# Patient Record
Sex: MALE | Race: WHITE | NOT HISPANIC OR LATINO
[De-identification: names, ages, dates, MRNs, and addresses within clinical notes are randomized per-mention and may not be internally consistent; named-entity substitution may affect disease eponyms.]

---

## 2017-01-07 ENCOUNTER — RX RENEWAL (OUTPATIENT)
Age: 77
End: 2017-01-07

## 2017-01-09 ENCOUNTER — APPOINTMENT (OUTPATIENT)
Dept: CARDIOLOGY | Facility: CLINIC | Age: 77
End: 2017-01-09

## 2017-01-09 ENCOUNTER — NON-APPOINTMENT (OUTPATIENT)
Age: 77
End: 2017-01-09

## 2017-01-09 VITALS
TEMPERATURE: 98.4 F | DIASTOLIC BLOOD PRESSURE: 75 MMHG | BODY MASS INDEX: 36.73 KG/M2 | WEIGHT: 234 LBS | HEIGHT: 67 IN | HEART RATE: 68 BPM | OXYGEN SATURATION: 97 % | SYSTOLIC BLOOD PRESSURE: 187 MMHG

## 2017-01-09 VITALS — DIASTOLIC BLOOD PRESSURE: 80 MMHG | SYSTOLIC BLOOD PRESSURE: 158 MMHG

## 2017-01-09 DIAGNOSIS — R00.2 PALPITATIONS: ICD-10-CM

## 2017-01-09 DIAGNOSIS — R10.31 RIGHT LOWER QUADRANT PAIN: ICD-10-CM

## 2017-01-10 ENCOUNTER — APPOINTMENT (OUTPATIENT)
Dept: INTERNAL MEDICINE | Facility: CLINIC | Age: 77
End: 2017-01-10

## 2017-01-10 VITALS
WEIGHT: 233 LBS | OXYGEN SATURATION: 97 % | BODY MASS INDEX: 36.57 KG/M2 | HEIGHT: 67 IN | TEMPERATURE: 97.8 F | HEART RATE: 58 BPM

## 2017-01-10 VITALS — DIASTOLIC BLOOD PRESSURE: 78 MMHG | SYSTOLIC BLOOD PRESSURE: 130 MMHG

## 2017-01-12 LAB
25(OH)D3 SERPL-MCNC: 47.3 NG/ML
ALBUMIN SERPL ELPH-MCNC: 4.7 G/DL
ALP BLD-CCNC: 45 U/L
ALT SERPL-CCNC: 41 U/L
ANION GAP SERPL CALC-SCNC: 16 MMOL/L
AST SERPL-CCNC: 43 U/L
BASOPHILS # BLD AUTO: 0.05 K/UL
BASOPHILS NFR BLD AUTO: 0.8 %
BILIRUB SERPL-MCNC: 0.9 MG/DL
BUN SERPL-MCNC: 19 MG/DL
CALCIUM SERPL-MCNC: 9.9 MG/DL
CHLORIDE SERPL-SCNC: 102 MMOL/L
CHOLEST SERPL-MCNC: 223 MG/DL
CHOLEST/HDLC SERPL: 3.7 RATIO
CO2 SERPL-SCNC: 26 MMOL/L
CREAT SERPL-MCNC: 1.07 MG/DL
EOSINOPHIL # BLD AUTO: 0.33 K/UL
EOSINOPHIL NFR BLD AUTO: 5 %
GLUCOSE SERPL-MCNC: 130 MG/DL
HBA1C MFR BLD HPLC: 6.2 %
HCT VFR BLD CALC: 48.9 %
HDLC SERPL-MCNC: 60 MG/DL
HGB BLD-MCNC: 15.6 G/DL
IMM GRANULOCYTES NFR BLD AUTO: 0.3 %
LDLC SERPL CALC-MCNC: 136 MG/DL
LYMPHOCYTES # BLD AUTO: 2.1 K/UL
LYMPHOCYTES NFR BLD AUTO: 32.1 %
MAN DIFF?: NORMAL
MCHC RBC-ENTMCNC: 31.9 GM/DL
MCHC RBC-ENTMCNC: 32.6 PG
MCV RBC AUTO: 102.3 FL
MONOCYTES # BLD AUTO: 0.76 K/UL
MONOCYTES NFR BLD AUTO: 11.6 %
NEUTROPHILS # BLD AUTO: 3.28 K/UL
NEUTROPHILS NFR BLD AUTO: 50.2 %
PLATELET # BLD AUTO: 184 K/UL
POTASSIUM SERPL-SCNC: 4.4 MMOL/L
PROT SERPL-MCNC: 7.7 G/DL
RBC # BLD: 4.78 M/UL
RBC # FLD: 14.3 %
SODIUM SERPL-SCNC: 144 MMOL/L
T4 SERPL-MCNC: 6.6 UG/DL
TRIGL SERPL-MCNC: 137 MG/DL
TSH SERPL-ACNC: 2.62 UIU/ML
WBC # FLD AUTO: 6.54 K/UL

## 2017-03-02 ENCOUNTER — EMERGENCY (EMERGENCY)
Facility: HOSPITAL | Age: 77
LOS: 1 days | Discharge: HOME | End: 2017-03-02
Attending: EMERGENCY MEDICINE | Admitting: EMERGENCY MEDICINE
Payer: MEDICARE

## 2017-03-02 VITALS
SYSTOLIC BLOOD PRESSURE: 175 MMHG | DIASTOLIC BLOOD PRESSURE: 78 MMHG | OXYGEN SATURATION: 98 % | HEART RATE: 66 BPM | RESPIRATION RATE: 20 BRPM | TEMPERATURE: 99 F

## 2017-03-02 VITALS
HEART RATE: 61 BPM | OXYGEN SATURATION: 100 % | SYSTOLIC BLOOD PRESSURE: 173 MMHG | DIASTOLIC BLOOD PRESSURE: 77 MMHG | RESPIRATION RATE: 20 BRPM

## 2017-03-02 DIAGNOSIS — R42 DIZZINESS AND GIDDINESS: ICD-10-CM

## 2017-03-02 DIAGNOSIS — Z90.49 ACQUIRED ABSENCE OF OTHER SPECIFIED PARTS OF DIGESTIVE TRACT: Chronic | ICD-10-CM

## 2017-03-02 LAB
ALBUMIN SERPL ELPH-MCNC: 4.6 G/DL — SIGNIFICANT CHANGE UP (ref 3.3–5)
ALP SERPL-CCNC: 45 U/L — SIGNIFICANT CHANGE UP (ref 40–120)
ALT FLD-CCNC: 49 U/L RC — HIGH (ref 10–45)
ANION GAP SERPL CALC-SCNC: 14 MMOL/L — SIGNIFICANT CHANGE UP (ref 5–17)
APTT BLD: 38.9 SEC — HIGH (ref 27.5–37.4)
AST SERPL-CCNC: 47 U/L — HIGH (ref 10–40)
BASOPHILS # BLD AUTO: 0.1 K/UL — SIGNIFICANT CHANGE UP (ref 0–0.2)
BASOPHILS NFR BLD AUTO: 1.1 % — SIGNIFICANT CHANGE UP (ref 0–2)
BILIRUB SERPL-MCNC: 1 MG/DL — SIGNIFICANT CHANGE UP (ref 0.2–1.2)
BUN SERPL-MCNC: 18 MG/DL — SIGNIFICANT CHANGE UP (ref 7–23)
CALCIUM SERPL-MCNC: 10.1 MG/DL — SIGNIFICANT CHANGE UP (ref 8.4–10.5)
CHLORIDE SERPL-SCNC: 102 MMOL/L — SIGNIFICANT CHANGE UP (ref 96–108)
CO2 SERPL-SCNC: 24 MMOL/L — SIGNIFICANT CHANGE UP (ref 22–31)
CREAT SERPL-MCNC: 1.17 MG/DL — SIGNIFICANT CHANGE UP (ref 0.5–1.3)
EOSINOPHIL # BLD AUTO: 0.3 K/UL — SIGNIFICANT CHANGE UP (ref 0–0.5)
EOSINOPHIL NFR BLD AUTO: 4.7 % — SIGNIFICANT CHANGE UP (ref 0–6)
GAS PNL BLDV: SIGNIFICANT CHANGE UP
GLUCOSE SERPL-MCNC: 105 MG/DL — HIGH (ref 70–99)
HCT VFR BLD CALC: 47.2 % — SIGNIFICANT CHANGE UP (ref 39–50)
HGB BLD-MCNC: 16 G/DL — SIGNIFICANT CHANGE UP (ref 13–17)
INR BLD: 1.14 RATIO — SIGNIFICANT CHANGE UP (ref 0.88–1.16)
LYMPHOCYTES # BLD AUTO: 2.5 K/UL — SIGNIFICANT CHANGE UP (ref 1–3.3)
LYMPHOCYTES # BLD AUTO: 35.8 % — SIGNIFICANT CHANGE UP (ref 13–44)
MAGNESIUM SERPL-MCNC: 2.1 MG/DL — SIGNIFICANT CHANGE UP (ref 1.6–2.6)
MCHC RBC-ENTMCNC: 33.5 PG — SIGNIFICANT CHANGE UP (ref 27–34)
MCHC RBC-ENTMCNC: 33.8 GM/DL — SIGNIFICANT CHANGE UP (ref 32–36)
MCV RBC AUTO: 99 FL — SIGNIFICANT CHANGE UP (ref 80–100)
MONOCYTES # BLD AUTO: 0.8 K/UL — SIGNIFICANT CHANGE UP (ref 0–0.9)
MONOCYTES NFR BLD AUTO: 11.6 % — SIGNIFICANT CHANGE UP (ref 2–14)
NEUTROPHILS # BLD AUTO: 3.3 K/UL — SIGNIFICANT CHANGE UP (ref 1.8–7.4)
NEUTROPHILS NFR BLD AUTO: 46.8 % — SIGNIFICANT CHANGE UP (ref 43–77)
PHOSPHATE SERPL-MCNC: 3.3 MG/DL — SIGNIFICANT CHANGE UP (ref 2.5–4.5)
PLATELET # BLD AUTO: 146 K/UL — LOW (ref 150–400)
POTASSIUM SERPL-MCNC: 4.1 MMOL/L — SIGNIFICANT CHANGE UP (ref 3.5–5.3)
POTASSIUM SERPL-SCNC: 4.1 MMOL/L — SIGNIFICANT CHANGE UP (ref 3.5–5.3)
PROT SERPL-MCNC: 7.6 G/DL — SIGNIFICANT CHANGE UP (ref 6–8.3)
PROTHROM AB SERPL-ACNC: 12.4 SEC — SIGNIFICANT CHANGE UP (ref 10–13.1)
RBC # BLD: 4.77 M/UL — SIGNIFICANT CHANGE UP (ref 4.2–5.8)
RBC # FLD: 12.3 % — SIGNIFICANT CHANGE UP (ref 10.3–14.5)
SODIUM SERPL-SCNC: 140 MMOL/L — SIGNIFICANT CHANGE UP (ref 135–145)
WBC # BLD: 7.1 K/UL — SIGNIFICANT CHANGE UP (ref 3.8–10.5)
WBC # FLD AUTO: 7.1 K/UL — SIGNIFICANT CHANGE UP (ref 3.8–10.5)

## 2017-03-02 PROCEDURE — 83735 ASSAY OF MAGNESIUM: CPT

## 2017-03-02 PROCEDURE — 80053 COMPREHEN METABOLIC PANEL: CPT

## 2017-03-02 PROCEDURE — 84100 ASSAY OF PHOSPHORUS: CPT

## 2017-03-02 PROCEDURE — 85014 HEMATOCRIT: CPT

## 2017-03-02 PROCEDURE — 85027 COMPLETE CBC AUTOMATED: CPT

## 2017-03-02 PROCEDURE — 99285 EMERGENCY DEPT VISIT HI MDM: CPT

## 2017-03-02 PROCEDURE — 82947 ASSAY GLUCOSE BLOOD QUANT: CPT

## 2017-03-02 PROCEDURE — 82435 ASSAY OF BLOOD CHLORIDE: CPT

## 2017-03-02 PROCEDURE — 84295 ASSAY OF SERUM SODIUM: CPT

## 2017-03-02 PROCEDURE — 82330 ASSAY OF CALCIUM: CPT

## 2017-03-02 PROCEDURE — 84132 ASSAY OF SERUM POTASSIUM: CPT

## 2017-03-02 PROCEDURE — 82803 BLOOD GASES ANY COMBINATION: CPT

## 2017-03-02 PROCEDURE — 83605 ASSAY OF LACTIC ACID: CPT

## 2017-03-02 PROCEDURE — 85730 THROMBOPLASTIN TIME PARTIAL: CPT

## 2017-03-02 PROCEDURE — 99284 EMERGENCY DEPT VISIT MOD MDM: CPT | Mod: 25

## 2017-03-02 PROCEDURE — 70450 CT HEAD/BRAIN W/O DYE: CPT

## 2017-03-02 PROCEDURE — 85610 PROTHROMBIN TIME: CPT

## 2017-03-02 PROCEDURE — 70450 CT HEAD/BRAIN W/O DYE: CPT | Mod: 26

## 2017-03-02 NOTE — ED PROVIDER NOTE - EYES, MLM
Clear bilaterally, pupils equal, round and reactive to light. EOMI. Mild physiological nystagmus when looking to L and R bilaterally that resolves after 1-2 beats. Mild rotation nystagmus when looking vertically that resolves after 1-2 beats. Cover uncover test negative with no eye deviation. W/ rapid head movements back toward midline, patients eyes remain fixated on nose.

## 2017-03-02 NOTE — ED PROVIDER NOTE - PSH
Aortic valve prosthesis present    S/P CABG x 3    S/P cholecystectomy  last year  S/P lumbar spine operation

## 2017-03-02 NOTE — ED PROVIDER NOTE - MEDICAL DECISION MAKING DETAILS
[facility] Resident: 77 y/o male with PMH of CAD w/ CABG, Aortic valve repair, HTN, HLD, NIDDM p/w L ear buzzing - states started acutely last night and has persisted - 1 episode of vertiginous like symptoms like lat night that since resolved - still reports persistent L ear buzzing - PE remarkable for mild rotational nystagmus with upward gaze - normal gait with cane and [facility] Resident: 75 y/o male with PMH of CAD w/ CABG, Aortic valve repair, HTN, HLD, NIDDM p/w L ear buzzing - states started acutely last night and has persisted - 1 episode of vertiginous like symptoms like lat night that since resolved - still reports persistent L ear buzzing - PE remarkable for mild rotational nystagmus with upward gaze - normal gait with cane and  ATTD: ringing in left ear. no focal deficits. check ct head, re eval for dispo  less likely tinnitus from ASA over dose as no change in recent home meds.

## 2017-03-02 NOTE — ED PROVIDER NOTE - OBJECTIVE STATEMENT
75 y/o male with PMH of CAD s/p CABG, Aortic valve repair p/w dizziness and tinnitus. 77 y/o male with PMH of HTN, HLD, NIDDM, CAD s/p CABG, Aortic valve repair not on coumadin p/w dizziness and tinnitus. Per patient, rolled over onto left side while in bed, when he developed a buzzing sensation in the L ear and the sound of blood flowing that interfered with his sleeping. When he tried to get up last night, states the when he was standing at the TV he saw it rotating vertically. When he awoken this morning, states dizziness resolved, but has persistent humming in L ear. Denies f/c, CP, SOB, N/V/D, vision changes, dizziness, dysuria, hematuria. Never had this in the past. States humming noise is less intense when standing up Came in b/c symptoms did not resolve.  PMD: Dr. Mustapha Gilliam

## 2017-03-02 NOTE — ED PROVIDER NOTE - ENMT, MLM
Airway patent, Nasal mucosa clear. Mouth with normal mucosa. No nasolabial fold droop. Cn2-12 grossly intact. Hearing intact an equal bilaterally.

## 2017-03-02 NOTE — ED PROVIDER NOTE - PLAN OF CARE
1) Please return to the ED should you have any new or worsening symptoms, worsening pain, develop headache, confusion, difficulty walking, or any concerning symptoms   2) Please follow up with neurology - please call [phone] to make an appointment

## 2017-03-02 NOTE — ED PROVIDER NOTE - ATTENDING CONTRIBUTION TO CARE
75 y/o m with pmhx CAD, HTN, HLD, DM type 2, presents for concern of left ear humming sound since last night. no associated weakness or numbness. no change in speech. tried to drown out sound with TV/ music and no relief. wife with him at the bedside. no fever no neck pain no chills . no change in vision.  When he tried to get up last night, states the when he was standing at the TV he saw it rotating vertically. Started having dizziness this morning when he awoke which resolved, but has persistent humming in L ear. Denies f/c, CP, SOB, N/V/D, vision changes, dizziness, dysuria, hematuria. Never had this in the past.    PMD: Dr. Mustapha Cortez  Gen. no acute distress, Non toxic,   HEENT: EOMI, mmm, tm clear. no carotid murmur. supple neck. pupils 3 mm reactive to light.   Lungs: CTAB/L no C/ W /R   CVS: S1S2   Abd; Soft non tender, non distended   Ext: no edema   Neuro AAOx3 non focal clear speech

## 2017-03-02 NOTE — ED PROVIDER NOTE - NEUROLOGICAL, MLM
Alert and oriented, no focal deficits, no motor or sensory deficits. CN2-12 intact. Normal gait with a cane

## 2017-03-02 NOTE — ED ADULT NURSE NOTE - OBJECTIVE STATEMENT
76 yr old male coming in with L ear "buzzing" and dizziness since 1:30AM; patient denies chest pain, sob, n/v/d, fevers, chills. No recent travel or sick contacts. Wife states patients face is more flushed than usual. No facial droop/slurred speech or arm drift. No unsteady gait upon arrival. Hx of stents; on aspirin. +PERRL. IV 18G left AC.

## 2017-03-02 NOTE — ED PROVIDER NOTE - CARE PLAN
Principal Discharge DX:	Ear ringing sound, left Principal Discharge DX:	Ear ringing sound, left  Instructions for follow-up, activity and diet:	1) Please return to the ED should you have any new or worsening symptoms, worsening pain, develop headache, confusion, difficulty walking, or any concerning symptoms   2) Please follow up with neurology - please call [phone] to make an appointment

## 2017-05-01 ENCOUNTER — APPOINTMENT (OUTPATIENT)
Dept: INTERNAL MEDICINE | Facility: CLINIC | Age: 77
End: 2017-05-01

## 2017-05-01 VITALS
HEIGHT: 67 IN | WEIGHT: 236 LBS | OXYGEN SATURATION: 95 % | SYSTOLIC BLOOD PRESSURE: 165 MMHG | TEMPERATURE: 97.9 F | BODY MASS INDEX: 37.04 KG/M2 | DIASTOLIC BLOOD PRESSURE: 76 MMHG | HEART RATE: 62 BPM

## 2017-05-01 RX ORDER — HYDROCHLOROTHIAZIDE 25 MG/1
25 TABLET ORAL DAILY
Qty: 1 | Refills: 3 | Status: DISCONTINUED | COMMUNITY
Start: 2017-01-10 | End: 2017-05-01

## 2017-05-02 ENCOUNTER — RX RENEWAL (OUTPATIENT)
Age: 77
End: 2017-05-02

## 2017-05-02 LAB
25(OH)D3 SERPL-MCNC: 49.6 NG/ML
ALBUMIN SERPL ELPH-MCNC: 4.6 G/DL
ALP BLD-CCNC: 48 U/L
ALT SERPL-CCNC: 52 U/L
ANION GAP SERPL CALC-SCNC: 17 MMOL/L
AST SERPL-CCNC: 62 U/L
BASOPHILS # BLD AUTO: 0.04 K/UL
BASOPHILS NFR BLD AUTO: 0.5 %
BILIRUB SERPL-MCNC: 1.1 MG/DL
BUN SERPL-MCNC: 19 MG/DL
CALCIUM SERPL-MCNC: 10 MG/DL
CHLORIDE SERPL-SCNC: 104 MMOL/L
CHOLEST SERPL-MCNC: 229 MG/DL
CHOLEST/HDLC SERPL: 4.6 RATIO
CO2 SERPL-SCNC: 22 MMOL/L
CREAT SERPL-MCNC: 1.1 MG/DL
EOSINOPHIL # BLD AUTO: 0.21 K/UL
EOSINOPHIL NFR BLD AUTO: 2.8 %
GLUCOSE SERPL-MCNC: 120 MG/DL
HBA1C MFR BLD HPLC: 6 %
HCT VFR BLD CALC: 46.9 %
HDLC SERPL-MCNC: 50 MG/DL
HGB BLD-MCNC: 15.9 G/DL
IMM GRANULOCYTES NFR BLD AUTO: 0.1 %
LDLC SERPL CALC-MCNC: 138 MG/DL
LYMPHOCYTES # BLD AUTO: 2.53 K/UL
LYMPHOCYTES NFR BLD AUTO: 33.3 %
MAN DIFF?: NORMAL
MCHC RBC-ENTMCNC: 33.7 PG
MCHC RBC-ENTMCNC: 33.9 GM/DL
MCV RBC AUTO: 99.4 FL
MONOCYTES # BLD AUTO: 0.6 K/UL
MONOCYTES NFR BLD AUTO: 7.9 %
NEUTROPHILS # BLD AUTO: 4.2 K/UL
NEUTROPHILS NFR BLD AUTO: 55.4 %
PLATELET # BLD AUTO: 154 K/UL
POTASSIUM SERPL-SCNC: 4.3 MMOL/L
PROT SERPL-MCNC: 7.5 G/DL
RBC # BLD: 4.72 M/UL
RBC # FLD: 13.9 %
SODIUM SERPL-SCNC: 143 MMOL/L
T4 SERPL-MCNC: 5.3 UG/DL
TRIGL SERPL-MCNC: 207 MG/DL
TSH SERPL-ACNC: 1.8 UIU/ML
WBC # FLD AUTO: 7.59 K/UL

## 2017-05-06 ENCOUNTER — RX RENEWAL (OUTPATIENT)
Age: 77
End: 2017-05-06

## 2017-05-06 RX ORDER — LANCETS
EACH MISCELLANEOUS
Qty: 100 | Refills: 3 | Status: ACTIVE | COMMUNITY
Start: 2017-05-06 | End: 1900-01-01

## 2017-05-08 ENCOUNTER — APPOINTMENT (OUTPATIENT)
Dept: CARDIOLOGY | Facility: CLINIC | Age: 77
End: 2017-05-08

## 2017-05-08 ENCOUNTER — NON-APPOINTMENT (OUTPATIENT)
Age: 77
End: 2017-05-08

## 2017-05-08 VITALS — SYSTOLIC BLOOD PRESSURE: 148 MMHG | DIASTOLIC BLOOD PRESSURE: 80 MMHG

## 2017-05-08 VITALS
SYSTOLIC BLOOD PRESSURE: 167 MMHG | HEART RATE: 68 BPM | WEIGHT: 235 LBS | HEIGHT: 67 IN | BODY MASS INDEX: 36.88 KG/M2 | DIASTOLIC BLOOD PRESSURE: 70 MMHG | OXYGEN SATURATION: 95 %

## 2017-06-02 ENCOUNTER — APPOINTMENT (OUTPATIENT)
Dept: CARDIOLOGY | Facility: CLINIC | Age: 77
End: 2017-06-02

## 2017-06-02 DIAGNOSIS — R09.89 OTHER SPECIFIED SYMPTOMS AND SIGNS INVOLVING THE CIRCULATORY AND RESPIRATORY SYSTEMS: ICD-10-CM

## 2017-07-31 ENCOUNTER — APPOINTMENT (OUTPATIENT)
Dept: ULTRASOUND IMAGING | Facility: CLINIC | Age: 77
End: 2017-07-31
Payer: MEDICARE

## 2017-07-31 ENCOUNTER — OUTPATIENT (OUTPATIENT)
Dept: OUTPATIENT SERVICES | Facility: HOSPITAL | Age: 77
LOS: 1 days | End: 2017-07-31
Payer: MEDICARE

## 2017-07-31 ENCOUNTER — APPOINTMENT (OUTPATIENT)
Dept: RADIOLOGY | Facility: CLINIC | Age: 77
End: 2017-07-31
Payer: MEDICARE

## 2017-07-31 DIAGNOSIS — Z90.49 ACQUIRED ABSENCE OF OTHER SPECIFIED PARTS OF DIGESTIVE TRACT: Chronic | ICD-10-CM

## 2017-07-31 DIAGNOSIS — R35.1 NOCTURIA: ICD-10-CM

## 2017-07-31 PROCEDURE — 74000: CPT | Mod: 26

## 2017-07-31 PROCEDURE — 76775 US EXAM ABDO BACK WALL LIM: CPT

## 2017-07-31 PROCEDURE — 74018 RADEX ABDOMEN 1 VIEW: CPT

## 2017-07-31 PROCEDURE — 76775 US EXAM ABDO BACK WALL LIM: CPT | Mod: 26

## 2017-08-28 ENCOUNTER — MOBILE ON CALL (OUTPATIENT)
Age: 77
End: 2017-08-28

## 2017-09-13 ENCOUNTER — APPOINTMENT (OUTPATIENT)
Dept: INTERNAL MEDICINE | Facility: CLINIC | Age: 77
End: 2017-09-13

## 2017-10-09 ENCOUNTER — APPOINTMENT (OUTPATIENT)
Dept: CARDIOLOGY | Facility: CLINIC | Age: 77
End: 2017-10-09
Payer: MEDICARE

## 2017-10-09 ENCOUNTER — NON-APPOINTMENT (OUTPATIENT)
Age: 77
End: 2017-10-09

## 2017-10-09 VITALS
HEART RATE: 67 BPM | BODY MASS INDEX: 37.28 KG/M2 | OXYGEN SATURATION: 96 % | WEIGHT: 238 LBS | DIASTOLIC BLOOD PRESSURE: 77 MMHG | SYSTOLIC BLOOD PRESSURE: 169 MMHG

## 2017-10-09 VITALS — DIASTOLIC BLOOD PRESSURE: 82 MMHG | SYSTOLIC BLOOD PRESSURE: 140 MMHG

## 2017-10-09 PROCEDURE — 93000 ELECTROCARDIOGRAM COMPLETE: CPT

## 2017-10-09 PROCEDURE — 99214 OFFICE O/P EST MOD 30 MIN: CPT

## 2017-11-28 ENCOUNTER — RX RENEWAL (OUTPATIENT)
Age: 77
End: 2017-11-28

## 2017-12-04 ENCOUNTER — LABORATORY RESULT (OUTPATIENT)
Age: 77
End: 2017-12-04

## 2017-12-04 ENCOUNTER — APPOINTMENT (OUTPATIENT)
Dept: INTERNAL MEDICINE | Facility: CLINIC | Age: 77
End: 2017-12-04
Payer: MEDICARE

## 2017-12-04 VITALS
TEMPERATURE: 98.3 F | SYSTOLIC BLOOD PRESSURE: 148 MMHG | BODY MASS INDEX: 36.73 KG/M2 | HEART RATE: 68 BPM | WEIGHT: 234 LBS | DIASTOLIC BLOOD PRESSURE: 68 MMHG | HEIGHT: 67 IN | OXYGEN SATURATION: 96 %

## 2017-12-04 DIAGNOSIS — N40.0 BENIGN PROSTATIC HYPERPLASIA WITHOUT LOWER URINARY TRACT SYMPMS: ICD-10-CM

## 2017-12-04 DIAGNOSIS — M48.00 SPINAL STENOSIS, SITE UNSPECIFIED: ICD-10-CM

## 2017-12-04 LAB
BILIRUB UR QL STRIP: NEGATIVE
CLARITY UR: CLEAR
COLLECTION METHOD: NORMAL
GLUCOSE UR-MCNC: NEGATIVE
HCG UR QL: 0.2 EU/DL
HGB UR QL STRIP.AUTO: NEGATIVE
KETONES UR-MCNC: NEGATIVE
LEUKOCYTE ESTERASE UR QL STRIP: NORMAL
NITRITE UR QL STRIP: NEGATIVE
PH UR STRIP: 5.5
PROT UR STRIP-MCNC: NORMAL
SP GR UR STRIP: 1.02

## 2017-12-04 PROCEDURE — 36415 COLL VENOUS BLD VENIPUNCTURE: CPT

## 2017-12-04 PROCEDURE — G0439: CPT

## 2017-12-04 PROCEDURE — 99214 OFFICE O/P EST MOD 30 MIN: CPT | Mod: 25

## 2017-12-04 PROCEDURE — 81002 URINALYSIS NONAUTO W/O SCOPE: CPT

## 2017-12-05 LAB
25(OH)D3 SERPL-MCNC: 42.5 NG/ML
BASOPHILS # BLD AUTO: 0.03 K/UL
BASOPHILS NFR BLD AUTO: 0.4 %
CHOLEST SERPL-MCNC: 208 MG/DL
CHOLEST/HDLC SERPL: 4.1 RATIO
CREAT SPEC-SCNC: 158 MG/DL
EOSINOPHIL # BLD AUTO: 0.28 K/UL
EOSINOPHIL NFR BLD AUTO: 4.1
HBA1C MFR BLD HPLC: 6.2 %
HCT VFR BLD CALC: 47 %
HDLC SERPL-MCNC: 51 MG/DL
HGB BLD-MCNC: 15.2 G/DL
IMM GRANULOCYTES NFR BLD AUTO: 0.3 %
LDLC SERPL CALC-MCNC: 114 MG/DL
LYMPHOCYTES # BLD AUTO: 2.01 K/UL
LYMPHOCYTES NFR BLD AUTO: 29.2 %
MAN DIFF?: NORMAL
MCHC RBC-ENTMCNC: 32.3 GM/DL
MCHC RBC-ENTMCNC: 32.8 PG
MCV RBC AUTO: 101.3 FL
MICROALBUMIN 24H UR DL<=1MG/L-MCNC: 54.6 MG/DL
MICROALBUMIN/CREAT 24H UR-RTO: 346 MG/G
MONOCYTES # BLD AUTO: 0.65 K/UL
MONOCYTES NFR BLD AUTO: 9.4 %
NEUTROPHILS # BLD AUTO: 3.9 K/UL
NEUTROPHILS NFR BLD AUTO: 56.6 %
PLATELET # BLD AUTO: 167 K/UL
PSA SERPL-MCNC: 0.67 NG/ML
RBC # BLD: 4.64 M/UL
RBC # FLD: 14.2 %
T4 SERPL-MCNC: 5.6 UG/DL
TRIGL SERPL-MCNC: 215 MG/DL
TSH SERPL-ACNC: 1.66 UIU/ML
WBC # FLD AUTO: 6.89 K/UL

## 2017-12-11 ENCOUNTER — MEDICATION RENEWAL (OUTPATIENT)
Age: 77
End: 2017-12-11

## 2017-12-13 ENCOUNTER — RX RENEWAL (OUTPATIENT)
Age: 77
End: 2017-12-13

## 2017-12-20 ENCOUNTER — APPOINTMENT (OUTPATIENT)
Dept: ORTHOPEDIC SURGERY | Facility: CLINIC | Age: 77
End: 2017-12-20
Payer: MEDICARE

## 2017-12-20 PROCEDURE — 73502 X-RAY EXAM HIP UNI 2-3 VIEWS: CPT

## 2017-12-20 PROCEDURE — 99203 OFFICE O/P NEW LOW 30 MIN: CPT

## 2018-02-11 ENCOUNTER — RESULT CHARGE (OUTPATIENT)
Age: 78
End: 2018-02-11

## 2018-02-12 ENCOUNTER — APPOINTMENT (OUTPATIENT)
Dept: CARDIOLOGY | Facility: CLINIC | Age: 78
End: 2018-02-12
Payer: MEDICARE

## 2018-02-12 ENCOUNTER — NON-APPOINTMENT (OUTPATIENT)
Age: 78
End: 2018-02-12

## 2018-02-12 VITALS
DIASTOLIC BLOOD PRESSURE: 71 MMHG | HEART RATE: 73 BPM | SYSTOLIC BLOOD PRESSURE: 176 MMHG | BODY MASS INDEX: 36.65 KG/M2 | OXYGEN SATURATION: 96 % | WEIGHT: 234 LBS

## 2018-02-12 VITALS — SYSTOLIC BLOOD PRESSURE: 158 MMHG | DIASTOLIC BLOOD PRESSURE: 84 MMHG

## 2018-02-12 DIAGNOSIS — R06.09 OTHER FORMS OF DYSPNEA: ICD-10-CM

## 2018-02-12 PROCEDURE — 93000 ELECTROCARDIOGRAM COMPLETE: CPT

## 2018-02-12 PROCEDURE — 99215 OFFICE O/P EST HI 40 MIN: CPT

## 2018-02-27 ENCOUNTER — APPOINTMENT (OUTPATIENT)
Dept: CARDIOLOGY | Facility: CLINIC | Age: 78
End: 2018-02-27
Payer: MEDICARE

## 2018-02-27 PROCEDURE — 93306 TTE W/DOPPLER COMPLETE: CPT

## 2018-02-27 PROCEDURE — 93880 EXTRACRANIAL BILAT STUDY: CPT

## 2018-03-01 ENCOUNTER — APPOINTMENT (OUTPATIENT)
Dept: CARDIOLOGY | Facility: CLINIC | Age: 78
End: 2018-03-01
Payer: MEDICARE

## 2018-03-01 ENCOUNTER — MED ADMIN CHARGE (OUTPATIENT)
Age: 78
End: 2018-03-01

## 2018-03-01 PROCEDURE — 93015 CV STRESS TEST SUPVJ I&R: CPT

## 2018-03-01 PROCEDURE — 78452 HT MUSCLE IMAGE SPECT MULT: CPT

## 2018-03-01 PROCEDURE — A9500: CPT

## 2018-03-01 RX ORDER — REGADENOSON 0.08 MG/ML
0.4 INJECTION, SOLUTION INTRAVENOUS
Qty: 1 | Refills: 0 | Status: COMPLETED | OUTPATIENT
Start: 2018-03-01

## 2018-03-01 RX ADMIN — REGADENOSON 0.4 MG/5ML: 0.08 INJECTION, SOLUTION INTRAVENOUS at 00:00

## 2018-04-05 ENCOUNTER — APPOINTMENT (OUTPATIENT)
Dept: INTERNAL MEDICINE | Facility: CLINIC | Age: 78
End: 2018-04-05
Payer: MEDICARE

## 2018-04-05 VITALS
SYSTOLIC BLOOD PRESSURE: 169 MMHG | WEIGHT: 235 LBS | OXYGEN SATURATION: 97 % | HEIGHT: 67 IN | HEART RATE: 65 BPM | BODY MASS INDEX: 36.88 KG/M2 | DIASTOLIC BLOOD PRESSURE: 80 MMHG | TEMPERATURE: 98.5 F

## 2018-04-05 DIAGNOSIS — M62.838 OTHER MUSCLE SPASM: ICD-10-CM

## 2018-04-05 DIAGNOSIS — R51 HEADACHE: ICD-10-CM

## 2018-04-05 DIAGNOSIS — Z86.39 PERSONAL HISTORY OF OTHER ENDOCRINE, NUTRITIONAL AND METABOLIC DISEASE: ICD-10-CM

## 2018-04-05 PROCEDURE — 36415 COLL VENOUS BLD VENIPUNCTURE: CPT

## 2018-04-05 PROCEDURE — 99214 OFFICE O/P EST MOD 30 MIN: CPT | Mod: 25

## 2018-04-05 RX ORDER — AMOXICILLIN AND CLAVULANATE POTASSIUM 875; 125 MG/1; MG/1
875-125 TABLET, COATED ORAL
Qty: 10 | Refills: 0 | Status: DISCONTINUED | COMMUNITY
Start: 2017-08-24 | End: 2018-04-05

## 2018-04-05 RX ORDER — MONTELUKAST 10 MG/1
10 TABLET, FILM COATED ORAL
Qty: 1 | Refills: 3 | Status: DISCONTINUED | COMMUNITY
Start: 2017-12-04 | End: 2018-04-05

## 2018-04-07 LAB
ALBUMIN SERPL ELPH-MCNC: 4.5 G/DL
ALP BLD-CCNC: 42 U/L
ALT SERPL-CCNC: 60 U/L
ANION GAP SERPL CALC-SCNC: 14 MMOL/L
AST SERPL-CCNC: 72 U/L
BASOPHILS # BLD AUTO: 0.04 K/UL
BASOPHILS NFR BLD AUTO: 0.7 %
BILIRUB SERPL-MCNC: 0.8 MG/DL
BUN SERPL-MCNC: 18 MG/DL
CALCIUM SERPL-MCNC: 9.7 MG/DL
CHLORIDE SERPL-SCNC: 105 MMOL/L
CHOLEST SERPL-MCNC: 221 MG/DL
CHOLEST/HDLC SERPL: 4.6 RATIO
CO2 SERPL-SCNC: 22 MMOL/L
CREAT SERPL-MCNC: 1.05 MG/DL
EOSINOPHIL # BLD AUTO: 0.28 K/UL
EOSINOPHIL NFR BLD AUTO: 4.9 %
ESTIMATED AVERAGE GLUCOSE: 140 MG/DL
GLUCOSE SERPL-MCNC: 142 MG/DL
HBA1C MFR BLD HPLC: 6.5 %
HCT VFR BLD CALC: 45.8 %
HDLC SERPL-MCNC: 48 MG/DL
HGB BLD-MCNC: 15.2 G/DL
IMM GRANULOCYTES NFR BLD AUTO: 0.2 %
LDLC SERPL CALC-MCNC: 144 MG/DL
LYMPHOCYTES # BLD AUTO: 2.32 K/UL
LYMPHOCYTES NFR BLD AUTO: 40.3 %
MAN DIFF?: NORMAL
MCHC RBC-ENTMCNC: 32.8 PG
MCHC RBC-ENTMCNC: 33.2 GM/DL
MCV RBC AUTO: 98.9 FL
MONOCYTES # BLD AUTO: 0.43 K/UL
MONOCYTES NFR BLD AUTO: 7.5 %
NEUTROPHILS # BLD AUTO: 2.68 K/UL
NEUTROPHILS NFR BLD AUTO: 46.4 %
PLATELET # BLD AUTO: 187 K/UL
POTASSIUM SERPL-SCNC: 4.2 MMOL/L
PROT SERPL-MCNC: 7.5 G/DL
RBC # BLD: 4.63 M/UL
RBC # FLD: 14.3 %
SODIUM SERPL-SCNC: 141 MMOL/L
TRIGL SERPL-MCNC: 146 MG/DL
WBC # FLD AUTO: 5.76 K/UL

## 2018-05-02 ENCOUNTER — MEDICATION RENEWAL (OUTPATIENT)
Age: 78
End: 2018-05-02

## 2018-05-03 ENCOUNTER — RX RENEWAL (OUTPATIENT)
Age: 78
End: 2018-05-03

## 2018-06-11 ENCOUNTER — APPOINTMENT (OUTPATIENT)
Dept: CARDIOLOGY | Facility: CLINIC | Age: 78
End: 2018-06-11
Payer: MEDICARE

## 2018-06-11 ENCOUNTER — NON-APPOINTMENT (OUTPATIENT)
Age: 78
End: 2018-06-11

## 2018-06-11 VITALS
OXYGEN SATURATION: 95 % | HEIGHT: 67 IN | WEIGHT: 241 LBS | HEART RATE: 68 BPM | SYSTOLIC BLOOD PRESSURE: 175 MMHG | DIASTOLIC BLOOD PRESSURE: 76 MMHG | BODY MASS INDEX: 37.83 KG/M2

## 2018-06-11 VITALS — DIASTOLIC BLOOD PRESSURE: 86 MMHG | SYSTOLIC BLOOD PRESSURE: 162 MMHG

## 2018-06-11 DIAGNOSIS — J30.9 ALLERGIC RHINITIS, UNSPECIFIED: ICD-10-CM

## 2018-06-11 PROCEDURE — 99214 OFFICE O/P EST MOD 30 MIN: CPT

## 2018-06-11 PROCEDURE — 93000 ELECTROCARDIOGRAM COMPLETE: CPT

## 2018-06-12 ENCOUNTER — APPOINTMENT (OUTPATIENT)
Dept: ORTHOPEDIC SURGERY | Facility: CLINIC | Age: 78
End: 2018-06-12
Payer: MEDICARE

## 2018-06-12 ENCOUNTER — MEDICATION RENEWAL (OUTPATIENT)
Age: 78
End: 2018-06-12

## 2018-06-12 VITALS
SYSTOLIC BLOOD PRESSURE: 179 MMHG | DIASTOLIC BLOOD PRESSURE: 74 MMHG | HEART RATE: 73 BPM | WEIGHT: 241 LBS | BODY MASS INDEX: 37.83 KG/M2 | HEIGHT: 67 IN

## 2018-06-12 DIAGNOSIS — M16.12 UNILATERAL PRIMARY OSTEOARTHRITIS, LEFT HIP: ICD-10-CM

## 2018-06-12 DIAGNOSIS — M16.11 UNILATERAL PRIMARY OSTEOARTHRITIS, RIGHT HIP: ICD-10-CM

## 2018-06-12 PROCEDURE — 73522 X-RAY EXAM HIPS BI 3-4 VIEWS: CPT

## 2018-06-12 PROCEDURE — 99215 OFFICE O/P EST HI 40 MIN: CPT

## 2018-07-09 ENCOUNTER — OUTPATIENT (OUTPATIENT)
Dept: OUTPATIENT SERVICES | Facility: HOSPITAL | Age: 78
LOS: 1 days | End: 2018-07-09
Payer: MEDICARE

## 2018-07-09 VITALS
HEART RATE: 71 BPM | RESPIRATION RATE: 17 BRPM | HEIGHT: 68 IN | TEMPERATURE: 98 F | OXYGEN SATURATION: 97 % | SYSTOLIC BLOOD PRESSURE: 166 MMHG | DIASTOLIC BLOOD PRESSURE: 72 MMHG | WEIGHT: 233.03 LBS

## 2018-07-09 DIAGNOSIS — I10 ESSENTIAL (PRIMARY) HYPERTENSION: ICD-10-CM

## 2018-07-09 DIAGNOSIS — I25.10 ATHEROSCLEROTIC HEART DISEASE OF NATIVE CORONARY ARTERY WITHOUT ANGINA PECTORIS: ICD-10-CM

## 2018-07-09 DIAGNOSIS — Z98.61 CORONARY ANGIOPLASTY STATUS: Chronic | ICD-10-CM

## 2018-07-09 DIAGNOSIS — Z90.49 ACQUIRED ABSENCE OF OTHER SPECIFIED PARTS OF DIGESTIVE TRACT: Chronic | ICD-10-CM

## 2018-07-09 DIAGNOSIS — Z29.9 ENCOUNTER FOR PROPHYLACTIC MEASURES, UNSPECIFIED: ICD-10-CM

## 2018-07-09 DIAGNOSIS — I65.21 OCCLUSION AND STENOSIS OF RIGHT CAROTID ARTERY: ICD-10-CM

## 2018-07-09 DIAGNOSIS — E11.9 TYPE 2 DIABETES MELLITUS WITHOUT COMPLICATIONS: ICD-10-CM

## 2018-07-09 DIAGNOSIS — Z01.818 ENCOUNTER FOR OTHER PREPROCEDURAL EXAMINATION: ICD-10-CM

## 2018-07-09 DIAGNOSIS — M16.9 OSTEOARTHRITIS OF HIP, UNSPECIFIED: ICD-10-CM

## 2018-07-09 DIAGNOSIS — Z98.890 OTHER SPECIFIED POSTPROCEDURAL STATES: Chronic | ICD-10-CM

## 2018-07-09 DIAGNOSIS — M16.2 BILATERAL OSTEOARTHRITIS RESULTING FROM HIP DYSPLASIA: ICD-10-CM

## 2018-07-09 LAB
ANION GAP SERPL CALC-SCNC: 17 MMOL/L — SIGNIFICANT CHANGE UP (ref 5–17)
BLD GP AB SCN SERPL QL: NEGATIVE — SIGNIFICANT CHANGE UP
BUN SERPL-MCNC: 20 MG/DL — SIGNIFICANT CHANGE UP (ref 7–23)
CALCIUM SERPL-MCNC: 10 MG/DL — SIGNIFICANT CHANGE UP (ref 8.4–10.5)
CHLORIDE SERPL-SCNC: 103 MMOL/L — SIGNIFICANT CHANGE UP (ref 96–108)
CO2 SERPL-SCNC: 21 MMOL/L — LOW (ref 22–31)
CREAT SERPL-MCNC: 1.13 MG/DL — SIGNIFICANT CHANGE UP (ref 0.5–1.3)
GLUCOSE SERPL-MCNC: 107 MG/DL — HIGH (ref 70–99)
POTASSIUM SERPL-MCNC: 4.1 MMOL/L — SIGNIFICANT CHANGE UP (ref 3.5–5.3)
POTASSIUM SERPL-SCNC: 4.1 MMOL/L — SIGNIFICANT CHANGE UP (ref 3.5–5.3)
RH IG SCN BLD-IMP: POSITIVE — SIGNIFICANT CHANGE UP
SODIUM SERPL-SCNC: 141 MMOL/L — SIGNIFICANT CHANGE UP (ref 135–145)

## 2018-07-09 PROCEDURE — G0463: CPT

## 2018-07-09 PROCEDURE — 83036 HEMOGLOBIN GLYCOSYLATED A1C: CPT

## 2018-07-09 PROCEDURE — 87640 STAPH A DNA AMP PROBE: CPT

## 2018-07-09 PROCEDURE — 86900 BLOOD TYPING SEROLOGIC ABO: CPT

## 2018-07-09 PROCEDURE — 85027 COMPLETE CBC AUTOMATED: CPT

## 2018-07-09 PROCEDURE — 80048 BASIC METABOLIC PNL TOTAL CA: CPT

## 2018-07-09 PROCEDURE — 86901 BLOOD TYPING SEROLOGIC RH(D): CPT

## 2018-07-09 PROCEDURE — 87641 MR-STAPH DNA AMP PROBE: CPT

## 2018-07-09 PROCEDURE — 86850 RBC ANTIBODY SCREEN: CPT

## 2018-07-09 RX ORDER — SODIUM CHLORIDE 9 MG/ML
3 INJECTION INTRAMUSCULAR; INTRAVENOUS; SUBCUTANEOUS EVERY 8 HOURS
Qty: 0 | Refills: 0 | Status: DISCONTINUED | OUTPATIENT
Start: 2018-07-30 | End: 2018-07-30

## 2018-07-09 RX ORDER — VANCOMYCIN HCL 1 G
1500 VIAL (EA) INTRAVENOUS ONCE
Qty: 0 | Refills: 0 | Status: COMPLETED | OUTPATIENT
Start: 2018-07-30 | End: 2018-07-30

## 2018-07-09 NOTE — H&P PST ADULT - NSANTHOSAYNRD_GEN_A_CORE
No. KATARZYNA screening performed.  STOP BANG Legend: 0-2 = LOW Risk; 3-4 = INTERMEDIATE Risk; 5-8 = HIGH Risk

## 2018-07-09 NOTE — H&P PST ADULT - PROBLEM SELECTOR PLAN 3
continue aspirin yeni-op  most recent echo/ stress on chart   will be evaluated by his cardiologist prior to surgery

## 2018-07-09 NOTE — H&P PST ADULT - PROBLEM SELECTOR PLAN 2
continue medication yeni-op  carotid duplex on chart   scheduled for cardiology evaluation prior to surgery

## 2018-07-09 NOTE — H&P PST ADULT - PSH
Aortic valve prosthesis present    S/P CABG x 3    S/P cholecystectomy  last year  S/P lumbar spine operation Aortic valve prosthesis present  bovine valve   h/o AVR 2007  History of lithotripsy  2013  History of PTCA    S/P CABG x 3    S/P cholecystectomy  last year  S/P lumbar spine operation

## 2018-07-09 NOTE — H&P PST ADULT - HISTORY OF PRESENT ILLNESS
77 year old male with h/o CAD s/p PTCA with stenting LAD 1999, CABG x3 with AVR (bovine) 2007, HTN, dyslipidemia, mild bilateral carotid stenosis, T2DM, obesity, osteoarthritis, BPH, GERD, presents to preadmission testing in preparation for left total hip replacement on July 30, 2018.

## 2018-07-09 NOTE — H&P PST ADULT - PMH
Asthma  seasonal asthma   no h/o intubation for asthma  last inhaler used many years ago  Carotid artery calcification, right  30-40% blockage as per patient  Coronary artery disease  h/o CABG x3 and Aortic valve replacement (bovine valve) 2007.  last angiogram 3 years ago  Diabetes mellitus  T2DM  fs range 120- 140 mg/dl   last A1c (6.5mg/dl)  Dyslipidemia associated with type 2 diabetes mellitus    Hypertension    Kidney calculus  s/p lithotripsy 2013  developed enterococcus faecalis post lithotripsy in 2013 (was on vancomycin x 1 month)  Prostate enlargement    Shortness of breath  H/O SOB 1999 prompted angiogram with coronary stenting x 1 (LAD)  Spinal stenosis at L4-L5 level  and cervical region   with neuropathy Asthma  seasonal asthma   no h/o intubation for asthma  last inhaler used many years ago  Carotid artery calcification, right  bilateral mild carotid calcification  Coronary artery disease  h/o CABG x3 and Aortic valve replacement (bovine valve) 2007.  last angiogram 3 years ago  Diabetes mellitus  T2DM  fs range 120- 140 mg/dl   last A1c (6.5mg/dl)  Dyslipidemia associated with type 2 diabetes mellitus    GERD (gastroesophageal reflux disease)    Hip osteoarthritis  left hip  Hypertension    Kidney calculus  s/p lithotripsy 2013  developed enterococcus faecalis post lithotripsy in 2013 (was on vancomycin x 1 month)  Prostate enlargement    Shortness of breath  H/O SOB 1999 prompted angiogram with coronary stenting x 1 (LAD)  Spinal stenosis at L4-L5 level  and cervical region   with neuropathy

## 2018-07-09 NOTE — H&P PST ADULT - ASSESSMENT
CAPRINI SCORE [CLOT]    AGE RELATED RISK FACTORS                                                       MOBILITY RELATED FACTORS  [ ] Age 41-60 years                                            (1 Point)                  [ ] Bed rest                                                        (1 Point)  [ ] Age: 61-74 years                                           (2 Points)                 [ ] Plaster cast                                                   (2 Points)  [3 ] Age= 75 years                                              (3 Points)                 [ ] Bed bound for more than 72 hours                 (2 Points)    DISEASE RELATED RISK FACTORS                                               GENDER SPECIFIC FACTORS  [ ] Edema in the lower extremities                       (1 Point)                  [ ] Pregnancy                                                     (1 Point)  [ ] Varicose veins                                               (1 Point)                  [ ] Post-partum < 6 weeks                                   (1 Point)             [1 ] BMI > 25 Kg/m2                                            (1 Point)                  [ ] Hormonal therapy  or oral contraception          (1 Point)                 [ ] Sepsis (in the previous month)                        (1 Point)                  [ ] History of pregnancy complications                 (1 point)  [ ] Pneumonia or serious lung disease                                               [ ] Unexplained or recurrent                     (1 Point)           (in the previous month)                               (1 Point)  [ ] Abnormal pulmonary function test                     (1 Point)                 SURGERY RELATED RISK FACTORS  [ ] Acute myocardial infarction                              (1 Point)                 [ ]  Section                                             (1 Point)  [ ] Congestive heart failure (in the previous month)  (1 Point)               [ ] Minor surgery                                                  (1 Point)   [ ] Inflammatory bowel disease                             (1 Point)                 [ ] Arthroscopic surgery                                        (2 Points)  [ ] Central venous access                                      (2 Points)                [ ] General surgery lasting more than 45 minutes   (2 Points)       [ ] Stroke (in the previous month)                          (5 Points)               [ 5] Elective arthroplasty                                         (5 Points)                                                                                                                                               HEMATOLOGY RELATED FACTORS                                                 TRAUMA RELATED RISK FACTORS  [ ] Prior episodes of VTE                                     (3 Points)                [ ] Fracture of the hip, pelvis, or leg                       (5 Points)  [ ] Positive family history for VTE                         (3 Points)                 [ ] Acute spinal cord injury (in the previous month)  (5 Points)  [ ] Prothrombin 26832 A                                     (3 Points)                 [ ] Paralysis  (less than 1 month)                             (5 Points)  [ ] Factor V Leiden                                             (3 Points)                  [ ] Multiple Trauma within 1 month                        (5 Points)  [ ] Lupus anticoagulants                                     (3 Points)                                                           [ ] Anticardiolipin antibodies                               (3 Points)                                                       [ ] High homocysteine in the blood                      (3 Points)                                             [ ] Other congenital or acquired thrombophilia      (3 Points)                                                [ ] Heparin induced thrombocytopenia                  (3 Points)                                          Total Score [         9 ]

## 2018-07-10 LAB
HBA1C BLD-MCNC: 6.7 % — HIGH (ref 4–5.6)
HCT VFR BLD CALC: 48 % — SIGNIFICANT CHANGE UP (ref 39–50)
HGB BLD-MCNC: 16.3 G/DL — SIGNIFICANT CHANGE UP (ref 13–17)
MCHC RBC-ENTMCNC: 33.6 PG — SIGNIFICANT CHANGE UP (ref 27–34)
MCHC RBC-ENTMCNC: 34 GM/DL — SIGNIFICANT CHANGE UP (ref 32–36)
MCV RBC AUTO: 99 FL — SIGNIFICANT CHANGE UP (ref 80–100)
MRSA PCR RESULT.: SIGNIFICANT CHANGE UP
PLATELET # BLD AUTO: 151 K/UL — SIGNIFICANT CHANGE UP (ref 150–400)
RBC # BLD: 4.85 M/UL — SIGNIFICANT CHANGE UP (ref 4.2–5.8)
RBC # FLD: 14.2 % — SIGNIFICANT CHANGE UP (ref 10.3–14.5)
S AUREUS DNA NOSE QL NAA+PROBE: SIGNIFICANT CHANGE UP
WBC # BLD: 7.27 K/UL — SIGNIFICANT CHANGE UP (ref 3.8–10.5)
WBC # FLD AUTO: 7.27 K/UL — SIGNIFICANT CHANGE UP (ref 3.8–10.5)

## 2018-07-13 ENCOUNTER — APPOINTMENT (OUTPATIENT)
Dept: INTERNAL MEDICINE | Facility: CLINIC | Age: 78
End: 2018-07-13
Payer: MEDICARE

## 2018-07-13 VITALS
DIASTOLIC BLOOD PRESSURE: 78 MMHG | BODY MASS INDEX: 36.57 KG/M2 | OXYGEN SATURATION: 96 % | TEMPERATURE: 98.1 F | SYSTOLIC BLOOD PRESSURE: 171 MMHG | HEART RATE: 70 BPM | WEIGHT: 233 LBS | HEIGHT: 67 IN

## 2018-07-13 DIAGNOSIS — Z01.818 ENCOUNTER FOR OTHER PREPROCEDURAL EXAMINATION: ICD-10-CM

## 2018-07-13 DIAGNOSIS — M16.12 UNILATERAL PRIMARY OSTEOARTHRITIS, LEFT HIP: ICD-10-CM

## 2018-07-13 DIAGNOSIS — B95.2 ENTEROCOCCUS AS THE CAUSE OF DISEASES CLASSIFIED ELSEWHERE: ICD-10-CM

## 2018-07-13 PROCEDURE — 99214 OFFICE O/P EST MOD 30 MIN: CPT

## 2018-07-13 RX ORDER — NITROFURANTOIN (MONOHYDRATE/MACROCRYSTALS) 25; 75 MG/1; MG/1
100 CAPSULE ORAL
Qty: 10 | Refills: 0 | Status: DISCONTINUED | COMMUNITY
Start: 2017-08-11 | End: 2018-07-13

## 2018-07-13 RX ORDER — GLUCOSAMINE/CHONDR SU A SOD 167-133 MG
125 MCG CAPSULE ORAL
Refills: 0 | Status: ACTIVE | COMMUNITY

## 2018-07-13 RX ORDER — UBIDECARENONE 100 MG
100 CAPSULE ORAL
Refills: 0 | Status: ACTIVE | COMMUNITY

## 2018-07-13 NOTE — HISTORY OF PRESENT ILLNESS
[FreeTextEntry1] : Hip Replacement [FreeTextEntry3] : Dr Batista [FreeTextEntry4] : Hip replacement\par To have cardiology clearance\par s/p stent, cabg and valve replacement\par Diabetes with last a1c 6.7

## 2018-07-13 NOTE — PLAN
[FreeTextEntry1] : Pre Op Hip replacement\par Blood pressure good\par Labs reviewed and OK\par Cardiology consult pending normal ef by echo\par \par Medically cleared\par To take amlodipine, asa and metoprolol am of surgery\par \par To hold metformin and lisinopril

## 2018-07-16 ENCOUNTER — CHART COPY (OUTPATIENT)
Age: 78
End: 2018-07-16

## 2018-07-17 ENCOUNTER — NON-APPOINTMENT (OUTPATIENT)
Age: 78
End: 2018-07-17

## 2018-07-17 ENCOUNTER — APPOINTMENT (OUTPATIENT)
Dept: CARDIOLOGY | Facility: CLINIC | Age: 78
End: 2018-07-17
Payer: MEDICARE

## 2018-07-17 VITALS
SYSTOLIC BLOOD PRESSURE: 134 MMHG | HEART RATE: 78 BPM | DIASTOLIC BLOOD PRESSURE: 72 MMHG | HEIGHT: 67 IN | OXYGEN SATURATION: 96 % | WEIGHT: 237 LBS | BODY MASS INDEX: 37.2 KG/M2

## 2018-07-17 PROBLEM — I65.21 OCCLUSION AND STENOSIS OF RIGHT CAROTID ARTERY: Chronic | Status: ACTIVE | Noted: 2018-07-09

## 2018-07-17 PROCEDURE — 93000 ELECTROCARDIOGRAM COMPLETE: CPT

## 2018-07-17 PROCEDURE — 99214 OFFICE O/P EST MOD 30 MIN: CPT

## 2018-07-27 PROBLEM — M16.12 PRIMARY LOCALIZED OSTEOARTHROSIS OF PELVIC REGION, LEFT: Status: ACTIVE | Noted: 2018-06-12

## 2018-07-27 PROBLEM — M16.11 PRIMARY LOCALIZED OSTEOARTHROSIS OF PELVIC REGION, RIGHT: Status: ACTIVE | Noted: 2018-06-12

## 2018-07-29 ENCOUNTER — TRANSCRIPTION ENCOUNTER (OUTPATIENT)
Age: 78
End: 2018-07-29

## 2018-07-30 ENCOUNTER — RESULT REVIEW (OUTPATIENT)
Age: 78
End: 2018-07-30

## 2018-07-30 ENCOUNTER — APPOINTMENT (OUTPATIENT)
Dept: ORTHOPEDIC SURGERY | Facility: HOSPITAL | Age: 78
End: 2018-07-30

## 2018-07-30 ENCOUNTER — INPATIENT (INPATIENT)
Facility: HOSPITAL | Age: 78
LOS: 1 days | Discharge: HOME | DRG: 470 | End: 2018-08-01
Attending: ORTHOPAEDIC SURGERY | Admitting: ORTHOPAEDIC SURGERY
Payer: MEDICARE

## 2018-07-30 VITALS
OXYGEN SATURATION: 95 % | DIASTOLIC BLOOD PRESSURE: 77 MMHG | HEART RATE: 76 BPM | SYSTOLIC BLOOD PRESSURE: 186 MMHG | HEIGHT: 68 IN | WEIGHT: 233.03 LBS | RESPIRATION RATE: 18 BRPM | TEMPERATURE: 98 F

## 2018-07-30 DIAGNOSIS — Z98.61 CORONARY ANGIOPLASTY STATUS: Chronic | ICD-10-CM

## 2018-07-30 DIAGNOSIS — M16.2 BILATERAL OSTEOARTHRITIS RESULTING FROM HIP DYSPLASIA: ICD-10-CM

## 2018-07-30 DIAGNOSIS — Z90.49 ACQUIRED ABSENCE OF OTHER SPECIFIED PARTS OF DIGESTIVE TRACT: Chronic | ICD-10-CM

## 2018-07-30 DIAGNOSIS — Z98.890 OTHER SPECIFIED POSTPROCEDURAL STATES: Chronic | ICD-10-CM

## 2018-07-30 LAB
ANION GAP SERPL CALC-SCNC: 15 MMOL/L — SIGNIFICANT CHANGE UP (ref 5–17)
BUN SERPL-MCNC: 17 MG/DL — SIGNIFICANT CHANGE UP (ref 7–23)
CALCIUM SERPL-MCNC: 8.8 MG/DL — SIGNIFICANT CHANGE UP (ref 8.4–10.5)
CHLORIDE SERPL-SCNC: 102 MMOL/L — SIGNIFICANT CHANGE UP (ref 96–108)
CO2 SERPL-SCNC: 23 MMOL/L — SIGNIFICANT CHANGE UP (ref 22–31)
CREAT SERPL-MCNC: 1.11 MG/DL — SIGNIFICANT CHANGE UP (ref 0.5–1.3)
GLUCOSE BLDC GLUCOMTR-MCNC: 133 MG/DL — HIGH (ref 70–99)
GLUCOSE BLDC GLUCOMTR-MCNC: 172 MG/DL — HIGH (ref 70–99)
GLUCOSE BLDC GLUCOMTR-MCNC: 200 MG/DL — HIGH (ref 70–99)
GLUCOSE SERPL-MCNC: 208 MG/DL — HIGH (ref 70–99)
HCT VFR BLD CALC: 44.1 % — SIGNIFICANT CHANGE UP (ref 39–50)
HGB BLD-MCNC: 14.6 G/DL — SIGNIFICANT CHANGE UP (ref 13–17)
MCHC RBC-ENTMCNC: 32.6 PG — SIGNIFICANT CHANGE UP (ref 27–34)
MCHC RBC-ENTMCNC: 33 GM/DL — SIGNIFICANT CHANGE UP (ref 32–36)
MCV RBC AUTO: 98.6 FL — SIGNIFICANT CHANGE UP (ref 80–100)
PLATELET # BLD AUTO: 153 K/UL — SIGNIFICANT CHANGE UP (ref 150–400)
POTASSIUM SERPL-MCNC: 3.9 MMOL/L — SIGNIFICANT CHANGE UP (ref 3.5–5.3)
POTASSIUM SERPL-SCNC: 3.9 MMOL/L — SIGNIFICANT CHANGE UP (ref 3.5–5.3)
RBC # BLD: 4.47 M/UL — SIGNIFICANT CHANGE UP (ref 4.2–5.8)
RBC # FLD: 12.3 % — SIGNIFICANT CHANGE UP (ref 10.3–14.5)
SODIUM SERPL-SCNC: 140 MMOL/L — SIGNIFICANT CHANGE UP (ref 135–145)
WBC # BLD: 13.5 K/UL — HIGH (ref 3.8–10.5)
WBC # FLD AUTO: 13.5 K/UL — HIGH (ref 3.8–10.5)

## 2018-07-30 PROCEDURE — 88311 DECALCIFY TISSUE: CPT | Mod: 26

## 2018-07-30 PROCEDURE — 88305 TISSUE EXAM BY PATHOLOGIST: CPT | Mod: 26

## 2018-07-30 PROCEDURE — 73501 X-RAY EXAM HIP UNI 1 VIEW: CPT | Mod: 26,LT

## 2018-07-30 PROCEDURE — 27130 TOTAL HIP ARTHROPLASTY: CPT | Mod: LT

## 2018-07-30 PROCEDURE — 72170 X-RAY EXAM OF PELVIS: CPT | Mod: 26,59

## 2018-07-30 RX ORDER — CEFAZOLIN SODIUM 1 G
2000 VIAL (EA) INJECTION EVERY 8 HOURS
Qty: 0 | Refills: 0 | Status: COMPLETED | OUTPATIENT
Start: 2018-07-30 | End: 2018-07-30

## 2018-07-30 RX ORDER — HYDROMORPHONE HYDROCHLORIDE 2 MG/ML
0.5 INJECTION INTRAMUSCULAR; INTRAVENOUS; SUBCUTANEOUS
Qty: 0 | Refills: 0 | Status: DISCONTINUED | OUTPATIENT
Start: 2018-07-30 | End: 2018-07-30

## 2018-07-30 RX ORDER — INSULIN LISPRO 100/ML
VIAL (ML) SUBCUTANEOUS AT BEDTIME
Qty: 0 | Refills: 0 | Status: DISCONTINUED | OUTPATIENT
Start: 2018-07-30 | End: 2018-08-01

## 2018-07-30 RX ORDER — ATORVASTATIN CALCIUM 80 MG/1
20 TABLET, FILM COATED ORAL AT BEDTIME
Qty: 0 | Refills: 0 | Status: DISCONTINUED | OUTPATIENT
Start: 2018-07-30 | End: 2018-08-01

## 2018-07-30 RX ORDER — OXYCODONE HYDROCHLORIDE 5 MG/1
10 TABLET ORAL EVERY 4 HOURS
Qty: 0 | Refills: 0 | Status: DISCONTINUED | OUTPATIENT
Start: 2018-07-30 | End: 2018-08-01

## 2018-07-30 RX ORDER — FINASTERIDE 5 MG/1
5 TABLET, FILM COATED ORAL DAILY
Qty: 0 | Refills: 0 | Status: DISCONTINUED | OUTPATIENT
Start: 2018-07-30 | End: 2018-08-01

## 2018-07-30 RX ORDER — TRAMADOL HYDROCHLORIDE 50 MG/1
50 TABLET ORAL ONCE
Qty: 0 | Refills: 0 | Status: DISCONTINUED | OUTPATIENT
Start: 2018-07-30 | End: 2018-07-30

## 2018-07-30 RX ORDER — DEXTROSE 50 % IN WATER 50 %
15 SYRINGE (ML) INTRAVENOUS ONCE
Qty: 0 | Refills: 0 | Status: DISCONTINUED | OUTPATIENT
Start: 2018-07-30 | End: 2018-08-01

## 2018-07-30 RX ORDER — ACETAMINOPHEN 500 MG
1000 TABLET ORAL EVERY 8 HOURS
Qty: 0 | Refills: 0 | Status: DISCONTINUED | OUTPATIENT
Start: 2018-07-30 | End: 2018-08-01

## 2018-07-30 RX ORDER — GABAPENTIN 400 MG/1
100 CAPSULE ORAL ONCE
Qty: 0 | Refills: 0 | Status: COMPLETED | OUTPATIENT
Start: 2018-07-30 | End: 2018-07-30

## 2018-07-30 RX ORDER — ACETAMINOPHEN 500 MG
650 TABLET ORAL EVERY 6 HOURS
Qty: 0 | Refills: 0 | Status: DISCONTINUED | OUTPATIENT
Start: 2018-07-30 | End: 2018-08-01

## 2018-07-30 RX ORDER — FENOFIBRATE,MICRONIZED 130 MG
145 CAPSULE ORAL DAILY
Qty: 0 | Refills: 0 | Status: DISCONTINUED | OUTPATIENT
Start: 2018-07-30 | End: 2018-08-01

## 2018-07-30 RX ORDER — ONDANSETRON 8 MG/1
4 TABLET, FILM COATED ORAL EVERY 6 HOURS
Qty: 0 | Refills: 0 | Status: DISCONTINUED | OUTPATIENT
Start: 2018-07-30 | End: 2018-08-01

## 2018-07-30 RX ORDER — SODIUM CHLORIDE 9 MG/ML
500 INJECTION INTRAMUSCULAR; INTRAVENOUS; SUBCUTANEOUS ONCE
Qty: 0 | Refills: 0 | Status: COMPLETED | OUTPATIENT
Start: 2018-07-30 | End: 2018-07-30

## 2018-07-30 RX ORDER — DEXTROSE 50 % IN WATER 50 %
25 SYRINGE (ML) INTRAVENOUS ONCE
Qty: 0 | Refills: 0 | Status: DISCONTINUED | OUTPATIENT
Start: 2018-07-30 | End: 2018-08-01

## 2018-07-30 RX ORDER — TRAMADOL HYDROCHLORIDE 50 MG/1
50 TABLET ORAL EVERY 8 HOURS
Qty: 0 | Refills: 0 | Status: DISCONTINUED | OUTPATIENT
Start: 2018-07-30 | End: 2018-08-01

## 2018-07-30 RX ORDER — INSULIN LISPRO 100/ML
VIAL (ML) SUBCUTANEOUS
Qty: 0 | Refills: 0 | Status: DISCONTINUED | OUTPATIENT
Start: 2018-07-30 | End: 2018-08-01

## 2018-07-30 RX ORDER — PANTOPRAZOLE SODIUM 20 MG/1
40 TABLET, DELAYED RELEASE ORAL ONCE
Qty: 0 | Refills: 0 | Status: COMPLETED | OUTPATIENT
Start: 2018-07-30 | End: 2018-07-30

## 2018-07-30 RX ORDER — ACETAMINOPHEN 500 MG
975 TABLET ORAL ONCE
Qty: 0 | Refills: 0 | Status: COMPLETED | OUTPATIENT
Start: 2018-07-30 | End: 2018-07-30

## 2018-07-30 RX ORDER — ACETAMINOPHEN 500 MG
975 TABLET ORAL EVERY 8 HOURS
Qty: 0 | Refills: 0 | Status: DISCONTINUED | OUTPATIENT
Start: 2018-07-31 | End: 2018-08-01

## 2018-07-30 RX ORDER — DOCUSATE SODIUM 100 MG
100 CAPSULE ORAL THREE TIMES A DAY
Qty: 0 | Refills: 0 | Status: DISCONTINUED | OUTPATIENT
Start: 2018-07-30 | End: 2018-08-01

## 2018-07-30 RX ORDER — CELECOXIB 200 MG/1
200 CAPSULE ORAL EVERY 12 HOURS
Qty: 0 | Refills: 0 | Status: DISCONTINUED | OUTPATIENT
Start: 2018-08-01 | End: 2018-08-01

## 2018-07-30 RX ORDER — OXYCODONE HYDROCHLORIDE 5 MG/1
5 TABLET ORAL EVERY 4 HOURS
Qty: 0 | Refills: 0 | Status: DISCONTINUED | OUTPATIENT
Start: 2018-07-30 | End: 2018-08-01

## 2018-07-30 RX ORDER — ASPIRIN/CALCIUM CARB/MAGNESIUM 324 MG
325 TABLET ORAL
Qty: 0 | Refills: 0 | Status: DISCONTINUED | OUTPATIENT
Start: 2018-07-30 | End: 2018-08-01

## 2018-07-30 RX ORDER — MAGNESIUM HYDROXIDE 400 MG/1
30 TABLET, CHEWABLE ORAL DAILY
Qty: 0 | Refills: 0 | Status: DISCONTINUED | OUTPATIENT
Start: 2018-07-30 | End: 2018-08-01

## 2018-07-30 RX ORDER — ACETAMINOPHEN 500 MG
1000 TABLET ORAL EVERY 8 HOURS
Qty: 0 | Refills: 0 | Status: COMPLETED | OUTPATIENT
Start: 2018-07-30 | End: 2018-07-30

## 2018-07-30 RX ORDER — SODIUM CHLORIDE 9 MG/ML
500 INJECTION INTRAMUSCULAR; INTRAVENOUS; SUBCUTANEOUS ONCE
Qty: 0 | Refills: 0 | Status: COMPLETED | OUTPATIENT
Start: 2018-07-31 | End: 2018-07-31

## 2018-07-30 RX ORDER — SODIUM CHLORIDE 9 MG/ML
1000 INJECTION, SOLUTION INTRAVENOUS
Qty: 0 | Refills: 0 | Status: DISCONTINUED | OUTPATIENT
Start: 2018-07-30 | End: 2018-08-01

## 2018-07-30 RX ORDER — LIDOCAINE HCL 20 MG/ML
0.2 VIAL (ML) INJECTION ONCE
Qty: 0 | Refills: 0 | Status: COMPLETED | OUTPATIENT
Start: 2018-07-30 | End: 2018-07-30

## 2018-07-30 RX ORDER — PANTOPRAZOLE SODIUM 20 MG/1
40 TABLET, DELAYED RELEASE ORAL DAILY
Qty: 0 | Refills: 0 | Status: DISCONTINUED | OUTPATIENT
Start: 2018-07-30 | End: 2018-08-01

## 2018-07-30 RX ORDER — LISINOPRIL 2.5 MG/1
5 TABLET ORAL DAILY
Qty: 0 | Refills: 0 | Status: DISCONTINUED | OUTPATIENT
Start: 2018-07-30 | End: 2018-08-01

## 2018-07-30 RX ORDER — DEXTROSE 50 % IN WATER 50 %
12.5 SYRINGE (ML) INTRAVENOUS ONCE
Qty: 0 | Refills: 0 | Status: DISCONTINUED | OUTPATIENT
Start: 2018-07-30 | End: 2018-08-01

## 2018-07-30 RX ORDER — GLUCAGON INJECTION, SOLUTION 0.5 MG/.1ML
1 INJECTION, SOLUTION SUBCUTANEOUS ONCE
Qty: 0 | Refills: 0 | Status: DISCONTINUED | OUTPATIENT
Start: 2018-07-30 | End: 2018-08-01

## 2018-07-30 RX ORDER — KETOROLAC TROMETHAMINE 30 MG/ML
15 SYRINGE (ML) INJECTION EVERY 8 HOURS
Qty: 0 | Refills: 0 | Status: DISCONTINUED | OUTPATIENT
Start: 2018-07-30 | End: 2018-08-01

## 2018-07-30 RX ORDER — METOPROLOL TARTRATE 50 MG
100 TABLET ORAL DAILY
Qty: 0 | Refills: 0 | Status: DISCONTINUED | OUTPATIENT
Start: 2018-07-30 | End: 2018-08-01

## 2018-07-30 RX ORDER — AMLODIPINE BESYLATE 2.5 MG/1
10 TABLET ORAL DAILY
Qty: 0 | Refills: 0 | Status: DISCONTINUED | OUTPATIENT
Start: 2018-07-30 | End: 2018-08-01

## 2018-07-30 RX ORDER — SENNA PLUS 8.6 MG/1
2 TABLET ORAL AT BEDTIME
Qty: 0 | Refills: 0 | Status: DISCONTINUED | OUTPATIENT
Start: 2018-07-30 | End: 2018-08-01

## 2018-07-30 RX ORDER — ONDANSETRON 8 MG/1
4 TABLET, FILM COATED ORAL ONCE
Qty: 0 | Refills: 0 | Status: DISCONTINUED | OUTPATIENT
Start: 2018-07-30 | End: 2018-07-30

## 2018-07-30 RX ORDER — POLYETHYLENE GLYCOL 3350 17 G/17G
17 POWDER, FOR SOLUTION ORAL DAILY
Qty: 0 | Refills: 0 | Status: DISCONTINUED | OUTPATIENT
Start: 2018-07-30 | End: 2018-08-01

## 2018-07-30 RX ADMIN — SODIUM CHLORIDE 1000 MILLILITER(S): 9 INJECTION INTRAMUSCULAR; INTRAVENOUS; SUBCUTANEOUS at 15:02

## 2018-07-30 RX ADMIN — POLYETHYLENE GLYCOL 3350 17 GRAM(S): 17 POWDER, FOR SOLUTION ORAL at 22:26

## 2018-07-30 RX ADMIN — Medication 100 MILLIGRAM(S): at 23:38

## 2018-07-30 RX ADMIN — SODIUM CHLORIDE 1000 MILLILITER(S): 9 INJECTION INTRAMUSCULAR; INTRAVENOUS; SUBCUTANEOUS at 11:32

## 2018-07-30 RX ADMIN — SODIUM CHLORIDE 125 MILLILITER(S): 9 INJECTION, SOLUTION INTRAVENOUS at 11:31

## 2018-07-30 RX ADMIN — Medication 2: at 17:22

## 2018-07-30 RX ADMIN — SODIUM CHLORIDE 125 MILLILITER(S): 9 INJECTION, SOLUTION INTRAVENOUS at 22:17

## 2018-07-30 RX ADMIN — Medication 100 MILLIGRAM(S): at 16:22

## 2018-07-30 RX ADMIN — Medication 400 MILLIGRAM(S): at 22:25

## 2018-07-30 RX ADMIN — Medication 100 MILLIGRAM(S): at 22:26

## 2018-07-30 RX ADMIN — ATORVASTATIN CALCIUM 20 MILLIGRAM(S): 80 TABLET, FILM COATED ORAL at 22:25

## 2018-07-30 RX ADMIN — SODIUM CHLORIDE 1000 MILLILITER(S): 9 INJECTION INTRAMUSCULAR; INTRAVENOUS; SUBCUTANEOUS at 22:17

## 2018-07-30 RX ADMIN — Medication 15 MILLIGRAM(S): at 22:29

## 2018-07-30 RX ADMIN — Medication 30 MILLILITER(S): at 22:31

## 2018-07-30 RX ADMIN — PANTOPRAZOLE SODIUM 40 MILLIGRAM(S): 20 TABLET, DELAYED RELEASE ORAL at 13:39

## 2018-07-30 RX ADMIN — Medication 325 MILLIGRAM(S): at 17:21

## 2018-07-30 RX ADMIN — LISINOPRIL 5 MILLIGRAM(S): 2.5 TABLET ORAL at 13:39

## 2018-07-30 RX ADMIN — Medication 4: at 12:35

## 2018-07-30 RX ADMIN — SENNA PLUS 2 TABLET(S): 8.6 TABLET ORAL at 22:36

## 2018-07-30 NOTE — CHART NOTE - NSCHARTNOTEFT_GEN_A_CORE
Pt states while on bed pan, he felt sweaty so he called the RN. Denies SOB/CP/Dizziness. RN said pt looked pale so she took a set of vitals which showed hypotension to 96/60 from 136/78. Finger stick 172. IVF started, pt already feeling better and color returning to face.    T(C): 36.5 (07-30-18 @ 21:37)  T(F): 97.7 (07-30-18 @ 21:37)  HR: 65 (07-30-18 @ 21:37)  BP: 96/60 (07-30-18 @ 21:37)  RR: 18 (07-30-18 @ 21:37)  SpO2: 94% (07-30-18 @ 21:37)    A/P: 77y Male s/p L ABRAM w/likely near vasovagal episode, now resolving; stable.  - 500cc NS IV Bolus ordered   - STAT CBC  - Cont current tx    Vesta Rosales PA-C  Orthopedic Surgery  Pagers [phone]

## 2018-07-30 NOTE — PATIENT PROFILE ADULT. - PSH
Aortic valve prosthesis present  bovine valve   h/o AVR 2007  History of lithotripsy  2013  History of PTCA    S/P CABG x 3    S/P cholecystectomy  last year  S/P lumbar spine operation

## 2018-07-30 NOTE — CHART NOTE - NSCHARTNOTEFT_GEN_A_CORE
Resting without complaints.  No Chest Pain, SOB, N/V.    T(C): 36.4 (07-30-18 @ 12:45), Max: 36.6 (07-30-18 @ 05:58)  HR: 85 (07-30-18 @ 13:45) (76 - 96)  BP: 142/71 (07-30-18 @ 13:45) (104/62 - 186/77)  RR: 19 (07-30-18 @ 13:45) (16 - 22)  SpO2: 94% (07-30-18 @ 13:45) (91% - 97%)  Wt(kg): --    Exam:  Alert and [place], No Acute Distress  Card: +S1/S2, RRR  Pulm: CTAB  L hip aquacel c/d/i with soft/compressible compartments  Calves soft, non-tender bilaterally  +PF/DF/EHL/FHL  SILT  + DP pulse    Xray: Intact L hip hardware                          14.6   13.5  )-----------( 153      ( 30 Jul 2018 11:41 )             44.1    07-30    140  |  102  |  17  ----------------------------<  208<H>  3.9   |  23  |  1.11    Ca    8.8      30 Jul 2018 11:41    A/P: 77yMale S/p  L Total Hip/Knee Arthroplasty. VSS. NAD  -PT/OT-WBAT With Posterior Hip Precautions  -IS  -DVT PPx  -Pain Control  -Continue Current Tx    Lawrence Cortez PA-C  Team Pager #[phone]

## 2018-07-30 NOTE — PATIENT PROFILE ADULT. - PMH
Asthma  seasonal asthma   no h/o intubation for asthma  last inhaler used many years ago  Carotid artery calcification, right  bilateral mild carotid calcification  Coronary artery disease  h/o CABG x3 and Aortic valve replacement (bovine valve) 2007.  last angiogram 3 years ago  Diabetes mellitus  T2DM  fs range 120- 140 mg/dl   last A1c (6.5mg/dl)  Dyslipidemia associated with type 2 diabetes mellitus    GERD (gastroesophageal reflux disease)    Hip osteoarthritis  left hip  Hypertension    Kidney calculus  s/p lithotripsy 2013  developed enterococcus faecalis post lithotripsy in 2013 (was on vancomycin x 1 month)  Prostate enlargement    Shortness of breath  H/O SOB 1999 prompted angiogram with coronary stenting x 1 (LAD)  Spinal stenosis at L4-L5 level  and cervical region   with neuropathy

## 2018-07-30 NOTE — PHYSICAL THERAPY INITIAL EVALUATION ADULT - PERTINENT HX OF CURRENT PROBLEM, REHAB EVAL
78 y/o male with h/o CAD s/p PTCA with stenting LAD 1999, CABG x3 with AVR (bovine) 2007, dyslipidemia, mild bilateral carotid stenosis, T2DM, obesity, BPH, GERD, osteoarthritis. Pt now presents s/p L THR.

## 2018-07-30 NOTE — PHYSICAL THERAPY INITIAL EVALUATION ADULT - ADDITIONAL COMMENTS
Pt states he lives in a private home with spouse with approximately 3-4 steps to enter (+handrail), and a flight of steps to bedroom/bathroom. Pt states prior to admission being independent with all functional mobility and ADLs. Pt states prior to admission he was ambulatory with 2 straight canes for back pain. Pt states he lives in a private home with spouse with approximately 3-4 steps to enter (+handrail), and a flight of steps to bedroom/bathroom. Pt states he has set up a temporary bedroom on the first floor. Pt states prior to admission being independent with all functional mobility and ADLs. Pt states prior to admission he was ambulatory with 2 straight canes for back pain.

## 2018-07-30 NOTE — PHYSICAL THERAPY INITIAL EVALUATION ADULT - GAIT TRAINING, PT EVAL
GOAL: Pt will ambulate 150 feet with least restrictive device as appropriate independently in 2 weeks

## 2018-07-30 NOTE — BRIEF OPERATIVE NOTE - PROCEDURE
<<-----Click on this checkbox to enter Procedure Total hip arthroplasty  07/30/2018  left  Active  TMULRY

## 2018-07-30 NOTE — PHYSICAL THERAPY INITIAL EVALUATION ADULT - TRANSFER TRAINING, PT EVAL
GOAL: Pt will transfer sit to/from stand with least restrictive device as appropriate independently in 2 weeks

## 2018-07-31 ENCOUNTER — TRANSCRIPTION ENCOUNTER (OUTPATIENT)
Age: 78
End: 2018-07-31

## 2018-07-31 LAB
ANION GAP SERPL CALC-SCNC: 16 MMOL/L — SIGNIFICANT CHANGE UP (ref 5–17)
BUN SERPL-MCNC: 18 MG/DL — SIGNIFICANT CHANGE UP (ref 7–23)
CALCIUM SERPL-MCNC: 8.9 MG/DL — SIGNIFICANT CHANGE UP (ref 8.4–10.5)
CHLORIDE SERPL-SCNC: 99 MMOL/L — SIGNIFICANT CHANGE UP (ref 96–108)
CO2 SERPL-SCNC: 24 MMOL/L — SIGNIFICANT CHANGE UP (ref 22–31)
CREAT SERPL-MCNC: 0.98 MG/DL — SIGNIFICANT CHANGE UP (ref 0.5–1.3)
GLUCOSE BLDC GLUCOMTR-MCNC: 152 MG/DL — HIGH (ref 70–99)
GLUCOSE BLDC GLUCOMTR-MCNC: 159 MG/DL — HIGH (ref 70–99)
GLUCOSE BLDC GLUCOMTR-MCNC: 180 MG/DL — HIGH (ref 70–99)
GLUCOSE BLDC GLUCOMTR-MCNC: 180 MG/DL — HIGH (ref 70–99)
GLUCOSE SERPL-MCNC: 179 MG/DL — HIGH (ref 70–99)
HCT VFR BLD CALC: 39 % — SIGNIFICANT CHANGE UP (ref 39–50)
HCT VFR BLD CALC: 39.3 % — SIGNIFICANT CHANGE UP (ref 39–50)
HGB BLD-MCNC: 13.2 G/DL — SIGNIFICANT CHANGE UP (ref 13–17)
HGB BLD-MCNC: 13.2 G/DL — SIGNIFICANT CHANGE UP (ref 13–17)
MCHC RBC-ENTMCNC: 32.5 PG — SIGNIFICANT CHANGE UP (ref 27–34)
MCHC RBC-ENTMCNC: 32.7 PG — SIGNIFICANT CHANGE UP (ref 27–34)
MCHC RBC-ENTMCNC: 33.6 GM/DL — SIGNIFICANT CHANGE UP (ref 32–36)
MCHC RBC-ENTMCNC: 33.8 GM/DL — SIGNIFICANT CHANGE UP (ref 32–36)
MCV RBC AUTO: 96.7 FL — SIGNIFICANT CHANGE UP (ref 80–100)
MCV RBC AUTO: 96.8 FL — SIGNIFICANT CHANGE UP (ref 80–100)
PLATELET # BLD AUTO: 140 K/UL — LOW (ref 150–400)
PLATELET # BLD AUTO: 148 K/UL — LOW (ref 150–400)
POTASSIUM SERPL-MCNC: 3.8 MMOL/L — SIGNIFICANT CHANGE UP (ref 3.5–5.3)
POTASSIUM SERPL-SCNC: 3.8 MMOL/L — SIGNIFICANT CHANGE UP (ref 3.5–5.3)
RBC # BLD: 4.03 M/UL — LOW (ref 4.2–5.8)
RBC # BLD: 4.06 M/UL — LOW (ref 4.2–5.8)
RBC # FLD: 12.2 % — SIGNIFICANT CHANGE UP (ref 10.3–14.5)
RBC # FLD: 13.8 % — SIGNIFICANT CHANGE UP (ref 10.3–14.5)
SODIUM SERPL-SCNC: 139 MMOL/L — SIGNIFICANT CHANGE UP (ref 135–145)
WBC # BLD: 12.77 K/UL — HIGH (ref 3.8–10.5)
WBC # BLD: 14.5 K/UL — HIGH (ref 3.8–10.5)
WBC # FLD AUTO: 12.77 K/UL — HIGH (ref 3.8–10.5)
WBC # FLD AUTO: 14.5 K/UL — HIGH (ref 3.8–10.5)

## 2018-07-31 RX ORDER — SODIUM CHLORIDE 0.65 %
1 AEROSOL, SPRAY (ML) NASAL
Qty: 0 | Refills: 0 | Status: DISCONTINUED | OUTPATIENT
Start: 2018-07-31 | End: 2018-08-01

## 2018-07-31 RX ORDER — FLUTICASONE PROPIONATE 50 MCG
1 SPRAY, SUSPENSION NASAL
Qty: 0 | Refills: 0 | Status: DISCONTINUED | OUTPATIENT
Start: 2018-07-31 | End: 2018-07-31

## 2018-07-31 RX ORDER — DIPHENHYDRAMINE HCL 50 MG
50 CAPSULE ORAL AT BEDTIME
Qty: 0 | Refills: 0 | Status: DISCONTINUED | OUTPATIENT
Start: 2018-07-31 | End: 2018-08-01

## 2018-07-31 RX ADMIN — Medication 1 SPRAY(S): at 18:06

## 2018-07-31 RX ADMIN — Medication 2: at 12:38

## 2018-07-31 RX ADMIN — Medication 2: at 07:36

## 2018-07-31 RX ADMIN — Medication 145 MILLIGRAM(S): at 12:17

## 2018-07-31 RX ADMIN — AMLODIPINE BESYLATE 10 MILLIGRAM(S): 2.5 TABLET ORAL at 06:09

## 2018-07-31 RX ADMIN — Medication 1 SPRAY(S): at 06:12

## 2018-07-31 RX ADMIN — SENNA PLUS 2 TABLET(S): 8.6 TABLET ORAL at 21:43

## 2018-07-31 RX ADMIN — Medication 15 MILLIGRAM(S): at 14:08

## 2018-07-31 RX ADMIN — Medication 15 MILLIGRAM(S): at 06:10

## 2018-07-31 RX ADMIN — ATORVASTATIN CALCIUM 20 MILLIGRAM(S): 80 TABLET, FILM COATED ORAL at 21:43

## 2018-07-31 RX ADMIN — Medication 975 MILLIGRAM(S): at 06:10

## 2018-07-31 RX ADMIN — Medication 100 MILLIGRAM(S): at 06:09

## 2018-07-31 RX ADMIN — TRAMADOL HYDROCHLORIDE 50 MILLIGRAM(S): 50 TABLET ORAL at 22:18

## 2018-07-31 RX ADMIN — PANTOPRAZOLE SODIUM 40 MILLIGRAM(S): 20 TABLET, DELAYED RELEASE ORAL at 12:17

## 2018-07-31 RX ADMIN — Medication 30 MILLILITER(S): at 06:11

## 2018-07-31 RX ADMIN — Medication 325 MILLIGRAM(S): at 06:09

## 2018-07-31 RX ADMIN — LISINOPRIL 5 MILLIGRAM(S): 2.5 TABLET ORAL at 06:09

## 2018-07-31 RX ADMIN — Medication 325 MILLIGRAM(S): at 18:06

## 2018-07-31 RX ADMIN — Medication 100 MILLIGRAM(S): at 21:43

## 2018-07-31 RX ADMIN — TRAMADOL HYDROCHLORIDE 50 MILLIGRAM(S): 50 TABLET ORAL at 21:48

## 2018-07-31 RX ADMIN — FINASTERIDE 5 MILLIGRAM(S): 5 TABLET, FILM COATED ORAL at 12:17

## 2018-07-31 RX ADMIN — SODIUM CHLORIDE 1000 MILLILITER(S): 9 INJECTION INTRAMUSCULAR; INTRAVENOUS; SUBCUTANEOUS at 06:08

## 2018-07-31 RX ADMIN — Medication 975 MILLIGRAM(S): at 21:43

## 2018-07-31 RX ADMIN — SODIUM CHLORIDE 125 MILLILITER(S): 9 INJECTION, SOLUTION INTRAVENOUS at 06:08

## 2018-07-31 RX ADMIN — Medication 2: at 18:06

## 2018-07-31 RX ADMIN — Medication 975 MILLIGRAM(S): at 12:17

## 2018-07-31 RX ADMIN — Medication 100 MILLIGRAM(S): at 12:17

## 2018-07-31 NOTE — DISCHARGE NOTE ADULT - PATIENT PORTAL LINK FT
You can access the [facility] Patient Portal, offered by [facility], by registering with the following website: [email or web address]

## 2018-07-31 NOTE — DISCHARGE NOTE ADULT - CARE PROVIDER_API CALL
Kenan Batista), Orthopaedic Surgery  [address] [place] [address]  Phone: [phone]  Fax: [phone]

## 2018-07-31 NOTE — DISCHARGE NOTE ADULT - ADDITIONAL INSTRUCTIONS
F/U with Dr Batista within 2 weeks.      Please f/u with your primary doctor after discharge from the hospital to discuss your hospital stay and any changes to your medication.

## 2018-07-31 NOTE — DISCHARGE NOTE ADULT - DURABLE MEDICAL EQUIPMENT AGENCY
[facility] for rolling walker delivery to patient's bedside.  [phone]

## 2018-07-31 NOTE — OCCUPATIONAL THERAPY INITIAL EVALUATION ADULT - LIVES WITH, PROFILE
Pt lives in private home with 4 steps to enter +HR, stairs to negotiate indoors however 1st floor setup available, walk in shower. Pt I in ADLs and ambulating with SC prior to admission/spouse

## 2018-07-31 NOTE — DISCHARGE NOTE ADULT - PLAN OF CARE
PAIN - PEDIATRIC    • Verbalizes/displays adequate comfort level or patient's stated pain goal Progressing        Patient/Family Goals    • Patient/Family Long Term Goal Progressing    • Patient/Family Short Term Goal Progressing            Saritha has done o improve ambulation, reduce pain F/U with Dr Batista within 2 weeks.  Keep dressing clean.  Dressing will be removed by surgeon at f/u visit.  Physical therapy for ambulation WBAT.  Take ecotrin 325mg po 2x/day x 6 weeks for prevention of clots.

## 2018-07-31 NOTE — PROGRESS NOTE ADULT - SUBJECTIVE AND OBJECTIVE BOX
Patient seen and examined. Pain controlled. Patient reports sensation of sweatiness and clamminess during the hypotensive episode yesterday has improved significantly.    Physical exam  VS: Afebrile, vital signs stable  Gen: NAD  Left LE: Dressing clean, dry, and intact. +EHL/FHL/TA/GSC. SILT L3-S1. +Dorsalis pedis, capillary refill brisk. Compartments soft and nontender.      77M s/p left ABRAM POD# 1    Weight bear as tolerated with posterior hip precautions  Pain control  DVT prophylaxis  Abduction while supine/seated  Physical therapy  Discharge planning

## 2018-07-31 NOTE — DISCHARGE NOTE ADULT - MEDICATION SUMMARY - MEDICATIONS TO CHANGE
I will SWITCH the dose or number of times a day I take the medications listed below when I get home from the hospital:    aspirin 81 mg oral tablet  -- twice a day

## 2018-07-31 NOTE — PROVIDER CONTACT NOTE (OTHER) - ASSESSMENT
pt b/p pt b/p 96/60, HR 65, Blood sugar - 172, pt stated " I feel very sweaty + clammy and dizzy", pt currently in bed on bedpan, bed alarm on, IVF running as ordered, L hip drsg CDI, pt c/o L hip pain 7/10.

## 2018-07-31 NOTE — DISCHARGE NOTE ADULT - CARE PLAN
Principal Discharge DX:	Hip osteoarthritis  Goal:	improve ambulation, reduce pain  Assessment and plan of treatment:	F/U with Dr Batista within 2 weeks.  Keep dressing clean.  Dressing will be removed by surgeon at f/u visit.  Physical therapy for ambulation WBAT.  Take ecotrin 325mg po 2x/day x 6 weeks for prevention of clots.

## 2018-07-31 NOTE — DISCHARGE NOTE ADULT - NS AS ACTIVITY OBS
Showering allowed/Stairs allowed/Walking-Indoors allowed/Do not make important decisions/Do not drive or operate machinery/Walking-Outdoors allowed/No Heavy lifting/straining

## 2018-07-31 NOTE — DISCHARGE NOTE ADULT - MEDICATION SUMMARY - MEDICATIONS TO TAKE
I will START or STAY ON the medications listed below when I get home from the hospital:    Rolling walker  -- Dx: S/p Total hip arthroplasty  -- Indication: For Supple    finasteride 5 mg oral tablet  -- 1 tab(s) by mouth once a day  -- Indication: For Prostate enlargement    acetaminophen 325 mg oral tablet  -- 3 tab(s) by mouth every 8 hours X 1 WEEK    (over-the-counter)  -- Indication: For Pain mgt    traMADol 50 mg oral tablet  -- 1 tab(s) by mouth every 8 hours, As needed,  pain MDD:3  -- Indication: For Pain mgt    aspirin 325 mg oral delayed release tablet  -- 1 tab(s) by mouth 2 times a day x 6 weeks for prevention of clots then resume aspirin 81mg po daily  -- Indication: For Clot prevention    lisinopril 5 mg oral tablet  -- 1 tab(s) by mouth once a day  -- Indication: For HTN    metFORMIN 500 mg oral tablet, extended release  -- 1 tab(s) by mouth once a day  -- Indication: For DM    fenofibrate 160 mg oral tablet  -- 1 tab(s) by mouth once a day  -- Indication: For HLD    rosuvastatin 5 mg oral tablet  -- twice a week   -- Indication: For HLD    Toprol- mg oral tablet, extended release  -- 1 tab(s) by mouth once a day  -- Indication: For HTN    amlodipine 10 mg oral tablet  -- 1 tab(s) by mouth once a day  -- Indication: For HTN    famotidine 40 mg oral tablet  -- 1 tab(s) by mouth once a day (at bedtime)  -- Indication: For GI    docusate sodium 100 mg oral capsule  -- 1 cap(s) by mouth 3 times a day  -- Indication: For laxative    polyethylene glycol 3350 oral powder for reconstitution  -- 17 gram(s) by mouth once a day  -- Indication: For laxative    senna oral tablet  -- 2 tab(s) by mouth once a day (at bedtime), As needed, Constipation  -- Indication: For laxative    multivitamin  -- 1 tab(s) by mouth once a day  -- Indication: For Supplement    Vitamin B-50  -- 2 tab(s) by mouth once a day  -- Indication: For Supplement    folic acid  -- 1600 milligram(s) by mouth once a day  -- Indication: For Supplement

## 2018-07-31 NOTE — OCCUPATIONAL THERAPY INITIAL EVALUATION ADULT - PERTINENT HX OF CURRENT PROBLEM, REHAB EVAL
78 yo M with h/o CAD s/p PTCA with stenting LAD 1999, CABG x3 with AVR (bovine) 2007, HTN, dyslipidemia, mild bilateral carotid stenosis, T2DM, obesity, osteoarthritis, BPH, GERD, presents to preadmission testing in preparation for left total hip replacement on July 30, 2018. See below

## 2018-07-31 NOTE — DISCHARGE NOTE ADULT - HOSPITAL COURSE
History of Present Illness		  77 year old male with h/o CAD s/p PTCA with stenting LAD 1999, CABG x3 with AVR (bovine) 2007, HTN, dyslipidemia, mild bilateral carotid stenosis, T2DM, obesity, osteoarthritis, BPH, GERD, presents to preadmission testing in preparation for left total hip replacement on July 30, 2018.    Admitted on7/30/18 for elective surgery.  S/P L THR.  Tolerated procedure well.  Physical therapy for ambulation WBAT.  PT recommended home with home PT.

## 2018-07-31 NOTE — OCCUPATIONAL THERAPY INITIAL EVALUATION ADULT - ANTICIPATED DISCHARGE DISPOSITION, OT EVAL
for ADLs and safety assessment in home environment.  Assist with ADLs as needed from wife and daughter/home w/ OT

## 2018-07-31 NOTE — OCCUPATIONAL THERAPY INITIAL EVALUATION ADULT - ADDITIONAL COMMENTS
Xray Pelvis: Status post left total hip arthroplasty without displaced acute periprosthetic fractures. There is a 5 mm radiopaque object visualized overlying the right thigh soft tissue, which may be external to the patient.

## 2018-08-01 VITALS
HEART RATE: 70 BPM | SYSTOLIC BLOOD PRESSURE: 135 MMHG | RESPIRATION RATE: 18 BRPM | TEMPERATURE: 98 F | OXYGEN SATURATION: 96 % | DIASTOLIC BLOOD PRESSURE: 70 MMHG

## 2018-08-01 LAB
GLUCOSE BLDC GLUCOMTR-MCNC: 149 MG/DL — HIGH (ref 70–99)
SURGICAL PATHOLOGY STUDY: SIGNIFICANT CHANGE UP

## 2018-08-01 PROCEDURE — C1776: CPT

## 2018-08-01 PROCEDURE — C1713: CPT

## 2018-08-01 PROCEDURE — 97161 PT EVAL LOW COMPLEX 20 MIN: CPT

## 2018-08-01 PROCEDURE — 97530 THERAPEUTIC ACTIVITIES: CPT

## 2018-08-01 PROCEDURE — 88311 DECALCIFY TISSUE: CPT

## 2018-08-01 PROCEDURE — 82962 GLUCOSE BLOOD TEST: CPT

## 2018-08-01 PROCEDURE — 97116 GAIT TRAINING THERAPY: CPT

## 2018-08-01 PROCEDURE — 97165 OT EVAL LOW COMPLEX 30 MIN: CPT

## 2018-08-01 PROCEDURE — 85027 COMPLETE CBC AUTOMATED: CPT

## 2018-08-01 PROCEDURE — 72170 X-RAY EXAM OF PELVIS: CPT

## 2018-08-01 PROCEDURE — 97110 THERAPEUTIC EXERCISES: CPT

## 2018-08-01 PROCEDURE — 97535 SELF CARE MNGMENT TRAINING: CPT

## 2018-08-01 PROCEDURE — 88305 TISSUE EXAM BY PATHOLOGIST: CPT

## 2018-08-01 PROCEDURE — 80048 BASIC METABOLIC PNL TOTAL CA: CPT

## 2018-08-01 PROCEDURE — 73501 X-RAY EXAM HIP UNI 1 VIEW: CPT

## 2018-08-01 RX ORDER — SENNA PLUS 8.6 MG/1
2 TABLET ORAL
Qty: 0 | Refills: 0 | DISCHARGE
Start: 2018-08-01

## 2018-08-01 RX ORDER — ASPIRIN/CALCIUM CARB/MAGNESIUM 324 MG
0 TABLET ORAL
Qty: 0 | Refills: 0 | COMMUNITY

## 2018-08-01 RX ORDER — DOCUSATE SODIUM 100 MG
1 CAPSULE ORAL
Qty: 0 | Refills: 0 | DISCHARGE
Start: 2018-08-01

## 2018-08-01 RX ORDER — TRAMADOL HYDROCHLORIDE 50 MG/1
1 TABLET ORAL
Qty: 21 | Refills: 0
Start: 2018-08-01 | End: 2018-08-07

## 2018-08-01 RX ORDER — POLYETHYLENE GLYCOL 3350 17 G/17G
17 POWDER, FOR SOLUTION ORAL
Qty: 0 | Refills: 0 | DISCHARGE
Start: 2018-08-01

## 2018-08-01 RX ORDER — ASPIRIN/CALCIUM CARB/MAGNESIUM 324 MG
1 TABLET ORAL
Qty: 0 | Refills: 0 | DISCHARGE
Start: 2018-08-01

## 2018-08-01 RX ORDER — ACETAMINOPHEN 500 MG
3 TABLET ORAL
Qty: 0 | Refills: 0 | DISCHARGE
Start: 2018-08-01

## 2018-08-01 RX ADMIN — Medication 15 MILLIGRAM(S): at 06:23

## 2018-08-01 RX ADMIN — Medication 975 MILLIGRAM(S): at 06:23

## 2018-08-01 RX ADMIN — LISINOPRIL 5 MILLIGRAM(S): 2.5 TABLET ORAL at 06:22

## 2018-08-01 RX ADMIN — Medication 15 MILLIGRAM(S): at 01:10

## 2018-08-01 RX ADMIN — Medication 100 MILLIGRAM(S): at 13:12

## 2018-08-01 RX ADMIN — Medication 15 MILLIGRAM(S): at 00:55

## 2018-08-01 RX ADMIN — Medication 1 SPRAY(S): at 06:26

## 2018-08-01 RX ADMIN — PANTOPRAZOLE SODIUM 40 MILLIGRAM(S): 20 TABLET, DELAYED RELEASE ORAL at 11:48

## 2018-08-01 RX ADMIN — Medication 100 MILLIGRAM(S): at 06:22

## 2018-08-01 RX ADMIN — Medication 145 MILLIGRAM(S): at 11:47

## 2018-08-01 RX ADMIN — AMLODIPINE BESYLATE 10 MILLIGRAM(S): 2.5 TABLET ORAL at 06:22

## 2018-08-01 RX ADMIN — Medication 15 MILLIGRAM(S): at 06:27

## 2018-08-01 RX ADMIN — TRAMADOL HYDROCHLORIDE 50 MILLIGRAM(S): 50 TABLET ORAL at 13:13

## 2018-08-01 RX ADMIN — Medication 975 MILLIGRAM(S): at 13:12

## 2018-08-01 RX ADMIN — FINASTERIDE 5 MILLIGRAM(S): 5 TABLET, FILM COATED ORAL at 11:47

## 2018-08-01 RX ADMIN — Medication 325 MILLIGRAM(S): at 06:22

## 2018-08-01 RX ADMIN — TRAMADOL HYDROCHLORIDE 50 MILLIGRAM(S): 50 TABLET ORAL at 12:47

## 2018-08-01 NOTE — PROGRESS NOTE ADULT - ASSESSMENT
76 y/o M s/p right total hip arthroplasty POD#2,  physical therapy, posterior total hip precautions  Clemencia Fox PA-C  Orthopaedic Surgery  Team pager [phone]  [facility] [phone]  [phone] 76 y/o M s/p Leftt total hip arthroplasty POD#2,  physical therapy, posterior total hip precautions  Clemencia Fox PA-C  Orthopaedic Surgery  Team pager [phone]  [facility] [phone]  [phone]

## 2018-08-01 NOTE — PROGRESS NOTE ADULT - SUBJECTIVE AND OBJECTIVE BOX
Patient is a 77y old  Male who presents with a chief complaint of "Hip pain."   Patient s/p Right total hip arthroplasty  POST OPERATIVE DAY #:  [2 ]   Patient comfortable  No complaints    T(C): 36.7 (08-01-18 @ 05:12), Max: 36.9 (07-31-18 @ 20:37)  HR: 73 (08-01-18 @ 05:12) (71 - 78)  BP: 162/75 (08-01-18 @ 05:12) (100/60 - 162/75)  RR: 18 (08-01-18 @ 05:12) (18 - 18)  SpO2: 94% (08-01-18 @ 05:12) (92% - 95%)    PHYSICAL EXAM:  NAD, Alert  [Right ] Hip: Dressing C/D/I; sensation grossly intact to light touch; (+) DF/PF; (+) Distal Pulses; No Calf tenderness B/L, PAS     LABS:                     13.2   12.77 )-----------( 148      ( 31 Jul 2018 08:34 )             39.3   07-31  139  |  99  |  18  ----------------------------<  179<H>  3.8   |  24  |  0.98  Ca    8.9      31 Jul 2018 06:51 Patient is a 77y old  Male who presents with a chief complaint of "Hip pain."   Patient s/p Left total hip arthroplasty  POST OPERATIVE DAY #:  [2 ]   Patient comfortable  No complaints    T(C): 36.7 (08-01-18 @ 05:12), Max: 36.9 (07-31-18 @ 20:37)  HR: 73 (08-01-18 @ 05:12) (71 - 78)  BP: 162/75 (08-01-18 @ 05:12) (100/60 - 162/75)  RR: 18 (08-01-18 @ 05:12) (18 - 18)  SpO2: 94% (08-01-18 @ 05:12) (92% - 95%)    PHYSICAL EXAM:  NAD, Alert  [Left ] Hip: Dressing C/D/I; sensation grossly intact to light touch; (+) DF/PF; (+) Distal Pulses; No Calf tenderness B/L, PAS     LABS:                     13.2   12.77 )-----------( 148      ( 31 Jul 2018 08:34 )             39.3   07-31  139  |  99  |  18  ----------------------------<  179<H>  3.8   |  24  |  0.98  Ca    8.9      31 Jul 2018 06:51

## 2018-08-01 NOTE — PROGRESS NOTE ADULT - ATTENDING COMMENTS
Patient is a 77y old  Male who presents with a chief complaint of "Hip pain." (30 Jul 2018 06:02)        Agree with last Resident or Physician's Assistant note:    Patient comfortable  No complaints    PHYSICAL EXAM:  NAD, Alert    [ ] Hip: Dressing C/D/I; sensation grossly intact to light touch; (+) DF/PF; (+) Distal Pulses; No Calf tenderness B/L, PAS     Plan for physical therapy to get out of bed, ambulate full weight bearing as tolerated. work on knee ROM          Plan for Disposition:  home care      Kenan Batista MD  [facility]  [phone]
Patient is a 77y old  Male who presents with a chief complaint of "Hip pain." (31 Jul 2018 13:51)        Agree with last Resident or Physician's Assistant note:    Patient comfortable  No complaints    PHYSICAL EXAM:  NAD, Alert    [ ] Hip: Dressing C/D/I; sensation grossly intact to light touch; (+) DF/PF; (+) Distal Pulses; No Calf tenderness B/L, PAS       Physical therapy. Patient is ambulating, progressing with ROM, sitting comfortably.       Plan for Disposition:  home care      Kenan Batista MD  [facility]  [phone]

## 2018-08-06 ENCOUNTER — INBOUND DOCUMENT (OUTPATIENT)
Age: 78
End: 2018-08-06

## 2018-08-14 ENCOUNTER — APPOINTMENT (OUTPATIENT)
Dept: ORTHOPEDIC SURGERY | Facility: CLINIC | Age: 78
End: 2018-08-14
Payer: MEDICARE

## 2018-08-14 DIAGNOSIS — Z96.642 PRESENCE OF LEFT ARTIFICIAL HIP JOINT: ICD-10-CM

## 2018-08-14 DIAGNOSIS — Z47.1 AFTERCARE FOLLOWING JOINT REPLACEMENT SURGERY: ICD-10-CM

## 2018-08-14 DIAGNOSIS — Z96.642 AFTERCARE FOLLOWING JOINT REPLACEMENT SURGERY: ICD-10-CM

## 2018-08-14 PROBLEM — N20.0 CALCULUS OF KIDNEY: Chronic | Status: ACTIVE | Noted: 2018-07-09

## 2018-08-14 PROBLEM — R06.02 SHORTNESS OF BREATH: Chronic | Status: ACTIVE | Noted: 2018-07-09

## 2018-08-14 PROBLEM — E11.69 TYPE 2 DIABETES MELLITUS WITH OTHER SPECIFIED COMPLICATION: Chronic | Status: ACTIVE | Noted: 2018-07-09

## 2018-08-14 PROBLEM — J45.909 UNSPECIFIED ASTHMA, UNCOMPLICATED: Chronic | Status: ACTIVE | Noted: 2018-07-09

## 2018-08-14 PROBLEM — K21.9 GASTRO-ESOPHAGEAL REFLUX DISEASE WITHOUT ESOPHAGITIS: Chronic | Status: ACTIVE | Noted: 2018-07-09

## 2018-08-14 PROBLEM — M48.061 SPINAL STENOSIS, LUMBAR REGION WITHOUT NEUROGENIC CLAUDICATION: Chronic | Status: ACTIVE | Noted: 2018-07-09

## 2018-08-14 PROBLEM — N40.0 BENIGN PROSTATIC HYPERPLASIA WITHOUT LOWER URINARY TRACT SYMPTOMS: Chronic | Status: ACTIVE | Noted: 2018-07-09

## 2018-08-14 PROBLEM — M16.9 OSTEOARTHRITIS OF HIP, UNSPECIFIED: Chronic | Status: ACTIVE | Noted: 2018-07-09

## 2018-08-14 PROCEDURE — 99024 POSTOP FOLLOW-UP VISIT: CPT

## 2018-08-14 PROCEDURE — 72170 X-RAY EXAM OF PELVIS: CPT

## 2018-08-24 ENCOUNTER — RX RENEWAL (OUTPATIENT)
Age: 78
End: 2018-08-24

## 2018-08-25 ENCOUNTER — TRANSCRIPTION ENCOUNTER (OUTPATIENT)
Age: 78
End: 2018-08-25

## 2018-09-25 ENCOUNTER — APPOINTMENT (OUTPATIENT)
Dept: ORTHOPEDIC SURGERY | Facility: CLINIC | Age: 78
End: 2018-09-25
Payer: MEDICARE

## 2018-09-25 PROCEDURE — 99024 POSTOP FOLLOW-UP VISIT: CPT

## 2018-09-25 PROCEDURE — 73502 X-RAY EXAM HIP UNI 2-3 VIEWS: CPT | Mod: LT

## 2018-10-04 ENCOUNTER — APPOINTMENT (OUTPATIENT)
Dept: INTERNAL MEDICINE | Facility: CLINIC | Age: 78
End: 2018-10-04
Payer: MEDICARE

## 2018-10-04 VITALS
BODY MASS INDEX: 37.35 KG/M2 | TEMPERATURE: 97.8 F | OXYGEN SATURATION: 97 % | HEIGHT: 67 IN | DIASTOLIC BLOOD PRESSURE: 76 MMHG | WEIGHT: 238 LBS | HEART RATE: 87 BPM | SYSTOLIC BLOOD PRESSURE: 150 MMHG

## 2018-10-04 DIAGNOSIS — Z87.898 PERSONAL HISTORY OF OTHER SPECIFIED CONDITIONS: ICD-10-CM

## 2018-10-04 DIAGNOSIS — Z96.649 PRESENCE OF UNSPECIFIED ARTIFICIAL HIP JOINT: ICD-10-CM

## 2018-10-04 LAB
BILIRUB UR QL STRIP: NEGATIVE
CLARITY UR: CLEAR
COLLECTION METHOD: NORMAL
GLUCOSE UR-MCNC: NEGATIVE
HCG UR QL: 0.2 EU/DL
HGB UR QL STRIP.AUTO: NEGATIVE
KETONES UR-MCNC: NEGATIVE
LEUKOCYTE ESTERASE UR QL STRIP: NORMAL
NITRITE UR QL STRIP: NEGATIVE
PH UR STRIP: 5.5
PROT UR STRIP-MCNC: NORMAL
SP GR UR STRIP: >=1.03

## 2018-10-04 PROCEDURE — 36415 COLL VENOUS BLD VENIPUNCTURE: CPT

## 2018-10-04 PROCEDURE — 81002 URINALYSIS NONAUTO W/O SCOPE: CPT

## 2018-10-04 PROCEDURE — 99214 OFFICE O/P EST MOD 30 MIN: CPT | Mod: 25

## 2018-10-04 NOTE — PLAN
[FreeTextEntry1] : Blood pressure fair\par diabetes on med\par cholesterol to be checked only on crestor twice a week??\par ashd no cp  on aspirin\par sleep trial of benadryl\par consider sleep study

## 2018-10-04 NOTE — PHYSICAL EXAM
[No Acute Distress] : no acute distress [Well Nourished] : well nourished [No JVD] : no jugular venous distention [Supple] : supple [No Respiratory Distress] : no respiratory distress  [Clear to Auscultation] : lungs were clear to auscultation bilaterally [Normal Rate] : normal rate

## 2018-10-04 NOTE — HISTORY OF PRESENT ILLNESS
[FreeTextEntry1] : post op left hip, diabetes, cholesterol, blood pressure and poor sleep [de-identified] : s/p left hip replacement\par diabetes on metformin no recent blood\par test\par poor sleep dose off  neck size 19\par cholesterol on crestor twice a week\par blood pressure on med\par no heartburn on famotidine\par on fenofibrate for lipids also\par \par

## 2018-10-05 LAB
25(OH)D3 SERPL-MCNC: 55.1 NG/ML
ALBUMIN SERPL ELPH-MCNC: 4.8 G/DL
ALP BLD-CCNC: 43 U/L
ALT SERPL-CCNC: 40 U/L
ANION GAP SERPL CALC-SCNC: 15 MMOL/L
AST SERPL-CCNC: 47 U/L
BASOPHILS # BLD AUTO: 0.04 K/UL
BASOPHILS NFR BLD AUTO: 0.5 %
BILIRUB SERPL-MCNC: 0.7 MG/DL
BUN SERPL-MCNC: 18 MG/DL
CALCIUM SERPL-MCNC: 10 MG/DL
CHLORIDE SERPL-SCNC: 103 MMOL/L
CHOLEST SERPL-MCNC: 201 MG/DL
CHOLEST/HDLC SERPL: 4 RATIO
CO2 SERPL-SCNC: 25 MMOL/L
CREAT SERPL-MCNC: 1.14 MG/DL
EOSINOPHIL # BLD AUTO: 0.38 K/UL
EOSINOPHIL NFR BLD AUTO: 5.1 %
FERRITIN SERPL-MCNC: 39 NG/ML
FOLATE SERPL-MCNC: >20 NG/ML
GLUCOSE SERPL-MCNC: 99 MG/DL
HBA1C MFR BLD HPLC: 6.2 %
HCT VFR BLD CALC: 46.2 %
HDLC SERPL-MCNC: 50 MG/DL
HGB BLD-MCNC: 14 G/DL
IMM GRANULOCYTES NFR BLD AUTO: 0.3 %
IRON SATN MFR SERPL: 16 %
IRON SERPL-MCNC: 75 UG/DL
LDLC SERPL CALC-MCNC: 118 MG/DL
LYMPHOCYTES # BLD AUTO: 2.56 K/UL
LYMPHOCYTES NFR BLD AUTO: 34.4 %
MAN DIFF?: NORMAL
MCHC RBC-ENTMCNC: 30.3 GM/DL
MCHC RBC-ENTMCNC: 30.4 PG
MCV RBC AUTO: 100.2 FL
MONOCYTES # BLD AUTO: 0.75 K/UL
MONOCYTES NFR BLD AUTO: 10.1 %
NEUTROPHILS # BLD AUTO: 3.69 K/UL
NEUTROPHILS NFR BLD AUTO: 49.6 %
PLATELET # BLD AUTO: 197 K/UL
POTASSIUM SERPL-SCNC: 4.6 MMOL/L
PROT SERPL-MCNC: 7.8 G/DL
RBC # BLD: 4.61 M/UL
RBC # FLD: 14.4 %
SODIUM SERPL-SCNC: 143 MMOL/L
T4 SERPL-MCNC: 6.2 UG/DL
TIBC SERPL-MCNC: 471 UG/DL
TRIGL SERPL-MCNC: 166 MG/DL
TSH SERPL-ACNC: 2.02 UIU/ML
UIBC SERPL-MCNC: 396 UG/DL
VIT B12 SERPL-MCNC: 705 PG/ML
WBC # FLD AUTO: 7.44 K/UL

## 2018-10-10 ENCOUNTER — RX RENEWAL (OUTPATIENT)
Age: 78
End: 2018-10-10

## 2018-10-28 ENCOUNTER — FORM ENCOUNTER (OUTPATIENT)
Age: 78
End: 2018-10-28

## 2018-11-26 ENCOUNTER — RX RENEWAL (OUTPATIENT)
Age: 78
End: 2018-11-26

## 2018-11-30 ENCOUNTER — RX RENEWAL (OUTPATIENT)
Age: 78
End: 2018-11-30

## 2018-12-04 ENCOUNTER — APPOINTMENT (OUTPATIENT)
Dept: CARDIOLOGY | Facility: CLINIC | Age: 78
End: 2018-12-04
Payer: MEDICARE

## 2018-12-04 ENCOUNTER — NON-APPOINTMENT (OUTPATIENT)
Age: 78
End: 2018-12-04

## 2018-12-04 VITALS
BODY MASS INDEX: 36.81 KG/M2 | HEART RATE: 69 BPM | WEIGHT: 235 LBS | DIASTOLIC BLOOD PRESSURE: 68 MMHG | OXYGEN SATURATION: 96 % | SYSTOLIC BLOOD PRESSURE: 168 MMHG

## 2018-12-04 PROCEDURE — 99214 OFFICE O/P EST MOD 30 MIN: CPT

## 2018-12-04 PROCEDURE — 93000 ELECTROCARDIOGRAM COMPLETE: CPT

## 2018-12-04 NOTE — REASON FOR VISIT
[FreeTextEntry1] : The patient comes in for followup of his atherosclerotic heart disease, aortic valve disease, hypertension, mitral and aortic insufficiency, and spinal stenosis. He denies any chest pains, or palpitations.He has had 3 or 4 episodes of shortness of breath. In addition he has had episodes where he just falls asleep during the day. Is not sleeping well at night. He had his hip surgery and although doesn't hurt while walking it hurts sometimes at night.

## 2018-12-04 NOTE — REVIEW OF SYSTEMS
[see HPI] : see HPI [Dyspnea on exertion] : dyspnea during exertion [Lower Ext Edema] : lower extremity edema [Negative] : Heme/Lymph [Shortness Of Breath] : shortness of breath [Chest Pain] : no chest pain [Palpitations] : no palpitations

## 2018-12-04 NOTE — DISCUSSION/SUMMARY
[FreeTextEntry1] : The patient was examined. His  blood pressure was 168/68, and  his pulse was 69. His lungs were clear to auscultation. Cardiac exam reveal a 3/6 systolic ejection murmur in the aortic area. There was also a left carotid bruit. His EKG showed normal sinus rhythm with one VPC,, and nonspecific ST and T wave changes. No acute changes were seen. He will continue his current medication, I will add furosemide 20 mg every other day to help with the shortness of breath and hypertension.  He'll return in 1 month, or earlier if needed.

## 2018-12-04 NOTE — PHYSICAL EXAM
[General Appearance - Well Developed] : well developed [Normal Appearance] : normal appearance [Well Groomed] : well groomed [General Appearance - Well Nourished] : well nourished [No Deformities] : no deformities [General Appearance - In No Acute Distress] : no acute distress [Normal Conjunctiva] : the conjunctiva exhibited no abnormalities [Eyelids - No Xanthelasma] : the eyelids demonstrated no xanthelasmas [Normal Oral Mucosa] : normal oral mucosa [No Oral Pallor] : no oral pallor [No Oral Cyanosis] : no oral cyanosis [Normal Jugular Venous A Waves Present] : normal jugular venous A waves present [Normal Jugular Venous V Waves Present] : normal jugular venous V waves present [No Jugular Venous Steward A Waves] : no jugular venous steward A waves [Respiration, Rhythm And Depth] : normal respiratory rhythm and effort [Exaggerated Use Of Accessory Muscles For Inspiration] : no accessory muscle use [Auscultation Breath Sounds / Voice Sounds] : lungs were clear to auscultation bilaterally [Heart Rate And Rhythm] : heart rate and rhythm were normal [Heart Sounds] : normal S1 and S2 [Abdomen Soft] : soft [Abdomen Tenderness] : non-tender [Abdomen Mass (___ Cm)] : no abdominal mass palpated [Nail Clubbing] : no clubbing of the fingernails [Cyanosis, Localized] : no localized cyanosis [Petechial Hemorrhages (___cm)] : no petechial hemorrhages [Skin Color & Pigmentation] : normal skin color and pigmentation [] : no rash [No Venous Stasis] : no venous stasis [Skin Lesions] : no skin lesions [No Skin Ulcers] : no skin ulcer [No Xanthoma] : no  xanthoma was observed [Oriented To Time, Place, And Person] : oriented to person, place, and time [Affect] : the affect was normal [Mood] : the mood was normal [No Anxiety] : not feeling anxious [FreeTextEntry1] : Walks with a cane.

## 2018-12-18 ENCOUNTER — APPOINTMENT (OUTPATIENT)
Dept: ORTHOPEDIC SURGERY | Facility: CLINIC | Age: 78
End: 2018-12-18
Payer: MEDICARE

## 2018-12-18 ENCOUNTER — APPOINTMENT (OUTPATIENT)
Dept: CARDIOLOGY | Facility: CLINIC | Age: 78
End: 2018-12-18
Payer: MEDICARE

## 2018-12-18 VITALS — SYSTOLIC BLOOD PRESSURE: 176 MMHG | HEART RATE: 66 BPM | DIASTOLIC BLOOD PRESSURE: 80 MMHG

## 2018-12-18 DIAGNOSIS — M16.11 UNILATERAL PRIMARY OSTEOARTHRITIS, RIGHT HIP: ICD-10-CM

## 2018-12-18 PROCEDURE — 72170 X-RAY EXAM OF PELVIS: CPT

## 2018-12-18 PROCEDURE — 99213 OFFICE O/P EST LOW 20 MIN: CPT

## 2018-12-18 PROCEDURE — 93880 EXTRACRANIAL BILAT STUDY: CPT

## 2018-12-20 ENCOUNTER — APPOINTMENT (OUTPATIENT)
Dept: INTERNAL MEDICINE | Facility: CLINIC | Age: 78
End: 2018-12-20

## 2019-02-27 ENCOUNTER — APPOINTMENT (OUTPATIENT)
Dept: CARDIOLOGY | Facility: CLINIC | Age: 79
End: 2019-02-27
Payer: MEDICARE

## 2019-02-27 ENCOUNTER — NON-APPOINTMENT (OUTPATIENT)
Age: 79
End: 2019-02-27

## 2019-02-27 VITALS
DIASTOLIC BLOOD PRESSURE: 78 MMHG | HEIGHT: 67 IN | BODY MASS INDEX: 37.04 KG/M2 | HEART RATE: 70 BPM | SYSTOLIC BLOOD PRESSURE: 167 MMHG | OXYGEN SATURATION: 97 % | WEIGHT: 236 LBS

## 2019-02-27 DIAGNOSIS — I35.9 NONRHEUMATIC AORTIC VALVE DISORDER, UNSPECIFIED: ICD-10-CM

## 2019-02-27 DIAGNOSIS — I05.9 RHEUMATIC MITRAL VALVE DISEASE, UNSPECIFIED: ICD-10-CM

## 2019-02-27 DIAGNOSIS — R06.02 SHORTNESS OF BREATH: ICD-10-CM

## 2019-02-27 PROCEDURE — 99215 OFFICE O/P EST HI 40 MIN: CPT

## 2019-02-27 PROCEDURE — 93000 ELECTROCARDIOGRAM COMPLETE: CPT

## 2019-02-27 NOTE — DISCUSSION/SUMMARY
[FreeTextEntry1] : The patient was examined. His  blood pressure was 167/78, and  his pulse was 70. His lungs were clear to auscultation. Cardiac exam reveal a 3/6 systolic ejection murmur in the aortic area. There was also a left carotid bruit. His EKG showed normal sinus rhythm, and nonspecific ST and T wave changes. No acute changes were seen. He will continue his current medication, I  He'll return in 4 months, or earlier if needed. Because of the shortness of breath and valvular heart disease will obtain an echocardiogram for LV size and function. Because shortness of breath was the patient's anginal couldn't before he got a coronary artery stent in 1999 we will send the patient for a pharmacological nuclear stress test to rule out increasing ischemia.

## 2019-02-27 NOTE — REVIEW OF SYSTEMS
[see HPI] : see HPI [Shortness Of Breath] : shortness of breath [Dyspnea on exertion] : dyspnea during exertion [Lower Ext Edema] : lower extremity edema [Negative] : Heme/Lymph [Chest Pain] : no chest pain [Palpitations] : no palpitations

## 2019-03-01 ENCOUNTER — TRANSCRIPTION ENCOUNTER (OUTPATIENT)
Age: 79
End: 2019-03-01

## 2019-03-02 ENCOUNTER — RX RENEWAL (OUTPATIENT)
Age: 79
End: 2019-03-02

## 2019-03-06 ENCOUNTER — RX RENEWAL (OUTPATIENT)
Age: 79
End: 2019-03-06

## 2019-03-14 ENCOUNTER — APPOINTMENT (OUTPATIENT)
Dept: CARDIOLOGY | Facility: CLINIC | Age: 79
End: 2019-03-14
Payer: MEDICARE

## 2019-03-14 PROCEDURE — 78452 HT MUSCLE IMAGE SPECT MULT: CPT

## 2019-03-14 PROCEDURE — 93015 CV STRESS TEST SUPVJ I&R: CPT

## 2019-03-14 PROCEDURE — 93306 TTE W/DOPPLER COMPLETE: CPT

## 2019-03-14 PROCEDURE — A9500: CPT

## 2019-03-14 RX ADMIN — REGADENOSON 0.4 MG/5ML: 0.08 INJECTION, SOLUTION INTRAVENOUS at 00:00

## 2019-03-15 RX ORDER — REGADENOSON 0.08 MG/ML
0.4 INJECTION, SOLUTION INTRAVENOUS
Qty: 1 | Refills: 0 | Status: COMPLETED | OUTPATIENT
Start: 2019-03-14

## 2019-04-04 ENCOUNTER — APPOINTMENT (OUTPATIENT)
Dept: INTERNAL MEDICINE | Facility: CLINIC | Age: 79
End: 2019-04-04

## 2019-04-17 ENCOUNTER — APPOINTMENT (OUTPATIENT)
Dept: INTERNAL MEDICINE | Facility: CLINIC | Age: 79
End: 2019-04-17
Payer: MEDICARE

## 2019-04-17 VITALS
TEMPERATURE: 98.4 F | HEIGHT: 67 IN | HEART RATE: 66 BPM | DIASTOLIC BLOOD PRESSURE: 71 MMHG | SYSTOLIC BLOOD PRESSURE: 158 MMHG | WEIGHT: 236 LBS | OXYGEN SATURATION: 97 % | BODY MASS INDEX: 37.04 KG/M2

## 2019-04-17 PROCEDURE — 36415 COLL VENOUS BLD VENIPUNCTURE: CPT

## 2019-04-17 PROCEDURE — 99214 OFFICE O/P EST MOD 30 MIN: CPT | Mod: 25

## 2019-04-17 NOTE — HISTORY OF PRESENT ILLNESS
[FreeTextEntry1] : diabetes [de-identified] : Diabetes check sugars daily 120-140\par nocturia x 3 stable\par on finasterie\par

## 2019-04-17 NOTE — PHYSICAL EXAM
[No Acute Distress] : no acute distress [Well Nourished] : well nourished [Normal Outer Ear/Nose] : the outer ears and nose were normal in appearance [Normal Oropharynx] : the oropharynx was normal [No JVD] : no jugular venous distention [Supple] : supple [No Respiratory Distress] : no respiratory distress  [Clear to Auscultation] : lungs were clear to auscultation bilaterally [Normal Rate] : normal rate  [No Edema] : there was no peripheral edema [Regular Rhythm] : with a regular rhythm [Normal Bowel Sounds] : normal bowel sounds [No HSM] : no HSM

## 2019-04-17 NOTE — PLAN
[FreeTextEntry1] : Diabetes to check blood\par increase metformin if a1c over 7\par ashd stable\par bph on med will  check psa on finasteide

## 2019-04-17 NOTE — REVIEW OF SYSTEMS
[Fever] : no fever [Chest Pain] : no chest pain [Orthopena] : no orthopnea [Abdominal Pain] : no abdominal pain [Vomiting] : no vomiting [Dysuria] : no dysuria [Incontinence] : no incontinence

## 2019-04-21 LAB
25(OH)D3 SERPL-MCNC: 57.7 NG/ML
ALBUMIN SERPL ELPH-MCNC: 4.6 G/DL
ALP BLD-CCNC: 43 U/L
ALT SERPL-CCNC: 53 U/L
ANION GAP SERPL CALC-SCNC: 14 MMOL/L
AST SERPL-CCNC: 66 U/L
BASOPHILS # BLD AUTO: 0.04 K/UL
BASOPHILS NFR BLD AUTO: 0.6 %
BILIRUB SERPL-MCNC: 1 MG/DL
BILIRUB UR QL STRIP: NEGATIVE
BUN SERPL-MCNC: 17 MG/DL
CALCIUM SERPL-MCNC: 9.6 MG/DL
CHLORIDE SERPL-SCNC: 104 MMOL/L
CHOLEST SERPL-MCNC: 193 MG/DL
CHOLEST/HDLC SERPL: 4.2 RATIO
CLARITY UR: CLEAR
CO2 SERPL-SCNC: 24 MMOL/L
COLLECTION METHOD: NORMAL
CREAT SERPL-MCNC: 0.91 MG/DL
EOSINOPHIL # BLD AUTO: 0.29 K/UL
EOSINOPHIL NFR BLD AUTO: 4.7 %
ESTIMATED AVERAGE GLUCOSE: 137 MG/DL
GLUCOSE SERPL-MCNC: 172 MG/DL
GLUCOSE UR-MCNC: NORMAL
HBA1C MFR BLD HPLC: 6.4 %
HCG UR QL: 0.2 EU/DL
HCT VFR BLD CALC: 48.3 %
HDLC SERPL-MCNC: 46 MG/DL
HGB BLD-MCNC: 15.7 G/DL
HGB UR QL STRIP.AUTO: NORMAL
IMM GRANULOCYTES NFR BLD AUTO: 0.3 %
KETONES UR-MCNC: NEGATIVE
LDLC SERPL CALC-MCNC: 112 MG/DL
LEUKOCYTE ESTERASE UR QL STRIP: NORMAL
LYMPHOCYTES # BLD AUTO: 2 K/UL
LYMPHOCYTES NFR BLD AUTO: 32.2 %
MAN DIFF?: NORMAL
MCHC RBC-ENTMCNC: 32.5 GM/DL
MCHC RBC-ENTMCNC: 32.9 PG
MCV RBC AUTO: 101.3 FL
MONOCYTES # BLD AUTO: 0.62 K/UL
MONOCYTES NFR BLD AUTO: 10 %
NEUTROPHILS # BLD AUTO: 3.25 K/UL
NEUTROPHILS NFR BLD AUTO: 52.2 %
NITRITE UR QL STRIP: NEGATIVE
PH UR STRIP: 7
PLATELET # BLD AUTO: 171 K/UL
POTASSIUM SERPL-SCNC: 4.1 MMOL/L
PROT SERPL-MCNC: 7.4 G/DL
PROT UR STRIP-MCNC: NORMAL
PSA SERPL-MCNC: 0.53 NG/ML
RBC # BLD: 4.77 M/UL
RBC # FLD: 13.5 %
SODIUM SERPL-SCNC: 141 MMOL/L
SP GR UR STRIP: 1.02
T4 FREE SERPL-MCNC: 1 NG/DL
TRIGL SERPL-MCNC: 173 MG/DL
TSH SERPL-ACNC: 1.73 UIU/ML
WBC # FLD AUTO: 6.22 K/UL

## 2019-05-19 ENCOUNTER — RX RENEWAL (OUTPATIENT)
Age: 79
End: 2019-05-19

## 2019-05-23 ENCOUNTER — MEDICATION RENEWAL (OUTPATIENT)
Age: 79
End: 2019-05-23

## 2019-06-03 ENCOUNTER — APPOINTMENT (OUTPATIENT)
Dept: ORTHOPEDIC SURGERY | Facility: CLINIC | Age: 79
End: 2019-06-03

## 2019-06-14 ENCOUNTER — RX RENEWAL (OUTPATIENT)
Age: 79
End: 2019-06-14

## 2019-06-16 ENCOUNTER — RX RENEWAL (OUTPATIENT)
Age: 79
End: 2019-06-16

## 2019-06-26 ENCOUNTER — APPOINTMENT (OUTPATIENT)
Dept: ORTHOPEDIC SURGERY | Facility: CLINIC | Age: 79
End: 2019-06-26
Payer: MEDICARE

## 2019-06-26 VITALS — BODY MASS INDEX: 36.81 KG/M2 | WEIGHT: 235 LBS

## 2019-06-26 DIAGNOSIS — M16.11 UNILATERAL PRIMARY OSTEOARTHRITIS, RIGHT HIP: ICD-10-CM

## 2019-06-26 PROCEDURE — 99214 OFFICE O/P EST MOD 30 MIN: CPT

## 2019-06-26 PROCEDURE — 73502 X-RAY EXAM HIP UNI 2-3 VIEWS: CPT | Mod: RT

## 2019-06-26 NOTE — DISCUSSION/SUMMARY
[de-identified] : This patient has right hip osteoarthritis.  He is failed a course of conservative management would like to proceed with a direct anterior approach right total hip arthroplasty.\par \par The patient is an appropriate candidate for consideration of right total hip replacement. An extensive discussion was conducted of the natural history of the disease and the variety of surgical and non-surgical treatment options available to the patient. A risk/benefit analysis was discussed with the patient reviewing the advantages and disadvantages of surgical intervention at this time. Both the level and length of the patient's pain have made additional conservative treatment measures consisting of physical therapy, and/or corticosteroids contraindicated. A full explanation was given of the nature and the purpose of the procedure and anesthesia, its benefits, possible alternative methods of diagnosis or treatment, the risks involved, the possibility of complications, the foreseeable consequences of the procedure and the possible results of the non-treatment.\par \par No guarantee or assurance was made as to the results that may be obtained. Specifically, the risks were identified to include, but are not limited to the following: Infection, phlebitis, pulmonary embolism, death, paralysis, dislocation, pain, stiffness, instability, limp, weakness, breakage, leg-length inequality, uncontrolled bleeding, nerve injury, blood vessel injury, pressure sores, anesthetic risks, delayed healing of wound and bone, and wear and loosening. Further discussion was undertaken with the patient about the details of surgical preparation, treatment, and postoperative rehabilitation including medical clearance, autotransfusion, the hospital course, and the postoperative rehabilitation involved. All in all, I feel that this patient is a good candidate for surgical reconstruction.\par

## 2019-06-26 NOTE — HISTORY OF PRESENT ILLNESS
[de-identified] : This is very nice 78-year-old gentleman experiencing chronic right hip and groin and thigh pain, which is severe in intensity. The pain substantially limits activities of daily living. Walking tolerance is reduced. Medication and activity modification have been minimally effective for a period lasting greater than three months in duration. Assistive devices and external support were not deemed by the patient to be helpful in improving their function. Due to the severity of osteoarthritis and level of pain, physical therapy is contraindicated. Pain and restriction of function are intolerable at this time.  Is previously undergone a successful left total hip arthroplasty utilizing a posterior approach by Dr. Batista.

## 2019-06-26 NOTE — PHYSICAL EXAM
[de-identified] : Patient is well nourished, well-developed, in no acute distress, with appropriate mood and affect. The patient is oriented to time, place, and person. Respirations are even and unlabored. Gait evaluation reveals a limp. There is no inguinal adenopathy. Examination of the contralateral hip shows normal range of motion, strength, no tenderness, and intact skin. The affected limb is well-perfused, shows a grossly normal motor and sensory examination. Examination of the hip shows no skin lesions. Hip motion is reduced and causes pain. Leg lengths are approximately 1 cm short on the right. Stinchfield test is positive. Both hips are stable and muscle strength is normal. Pedal pulses are palpable.\par  [de-identified] : AP and lateral x-rays of the right hip, pelvis, and femur were ordered and taken in the office and demonstrate degenerative joint disease of the hip with joint space narrowing, osteophyte formation, and subchondral sclerosis.\par

## 2019-06-27 ENCOUNTER — INPATIENT (INPATIENT)
Facility: HOSPITAL | Age: 79
LOS: 0 days | Discharge: HOME | DRG: 247 | End: 2019-06-28
Attending: INTERNAL MEDICINE | Admitting: INTERNAL MEDICINE
Payer: MEDICARE

## 2019-06-27 ENCOUNTER — TRANSCRIPTION ENCOUNTER (OUTPATIENT)
Age: 79
End: 2019-06-27

## 2019-06-27 VITALS
HEART RATE: 67 BPM | DIASTOLIC BLOOD PRESSURE: 79 MMHG | HEIGHT: 67 IN | WEIGHT: 235.01 LBS | SYSTOLIC BLOOD PRESSURE: 197 MMHG | OXYGEN SATURATION: 96 % | TEMPERATURE: 98 F | RESPIRATION RATE: 18 BRPM

## 2019-06-27 DIAGNOSIS — R07.9 CHEST PAIN, UNSPECIFIED: ICD-10-CM

## 2019-06-27 DIAGNOSIS — Z98.61 CORONARY ANGIOPLASTY STATUS: Chronic | ICD-10-CM

## 2019-06-27 DIAGNOSIS — Z98.890 OTHER SPECIFIED POSTPROCEDURAL STATES: Chronic | ICD-10-CM

## 2019-06-27 DIAGNOSIS — Z90.49 ACQUIRED ABSENCE OF OTHER SPECIFIED PARTS OF DIGESTIVE TRACT: Chronic | ICD-10-CM

## 2019-06-27 LAB
ALBUMIN SERPL ELPH-MCNC: 4.6 G/DL — SIGNIFICANT CHANGE UP (ref 3.3–5)
ALP SERPL-CCNC: 40 U/L — SIGNIFICANT CHANGE UP (ref 40–120)
ALT FLD-CCNC: 50 U/L — HIGH (ref 10–45)
ANION GAP SERPL CALC-SCNC: 17 MMOL/L — SIGNIFICANT CHANGE UP (ref 5–17)
APTT BLD: 32.5 SEC — SIGNIFICANT CHANGE UP (ref 27.5–36.3)
AST SERPL-CCNC: 56 U/L — HIGH (ref 10–40)
BILIRUB SERPL-MCNC: 0.7 MG/DL — SIGNIFICANT CHANGE UP (ref 0.2–1.2)
BUN SERPL-MCNC: 20 MG/DL — SIGNIFICANT CHANGE UP (ref 7–23)
CALCIUM SERPL-MCNC: 10 MG/DL — SIGNIFICANT CHANGE UP (ref 8.4–10.5)
CHLORIDE SERPL-SCNC: 102 MMOL/L — SIGNIFICANT CHANGE UP (ref 96–108)
CO2 SERPL-SCNC: 26 MMOL/L — SIGNIFICANT CHANGE UP (ref 22–31)
CREAT SERPL-MCNC: 1.07 MG/DL — SIGNIFICANT CHANGE UP (ref 0.5–1.3)
GLUCOSE BLDC GLUCOMTR-MCNC: 113 MG/DL — HIGH (ref 70–99)
GLUCOSE BLDC GLUCOMTR-MCNC: 115 MG/DL — HIGH (ref 70–99)
GLUCOSE BLDC GLUCOMTR-MCNC: 129 MG/DL — HIGH (ref 70–99)
GLUCOSE BLDC GLUCOMTR-MCNC: 140 MG/DL — HIGH (ref 70–99)
GLUCOSE SERPL-MCNC: 151 MG/DL — HIGH (ref 70–99)
HBA1C BLD-MCNC: 6.2 % — HIGH (ref 4–5.6)
HCT VFR BLD CALC: 48 % — SIGNIFICANT CHANGE UP (ref 39–50)
HGB BLD-MCNC: 15.3 G/DL — SIGNIFICANT CHANGE UP (ref 13–17)
INR BLD: 1.07 RATIO — SIGNIFICANT CHANGE UP (ref 0.88–1.16)
MCHC RBC-ENTMCNC: 31.8 GM/DL — LOW (ref 32–36)
MCHC RBC-ENTMCNC: 32.4 PG — SIGNIFICANT CHANGE UP (ref 27–34)
MCV RBC AUTO: 102 FL — HIGH (ref 80–100)
NT-PROBNP SERPL-SCNC: 205 PG/ML — SIGNIFICANT CHANGE UP (ref 0–300)
PLATELET # BLD AUTO: 142 K/UL — LOW (ref 150–400)
POTASSIUM SERPL-MCNC: 4.1 MMOL/L — SIGNIFICANT CHANGE UP (ref 3.5–5.3)
POTASSIUM SERPL-SCNC: 4.1 MMOL/L — SIGNIFICANT CHANGE UP (ref 3.5–5.3)
PROT SERPL-MCNC: 7.7 G/DL — SIGNIFICANT CHANGE UP (ref 6–8.3)
PROTHROM AB SERPL-ACNC: 12.3 SEC — SIGNIFICANT CHANGE UP (ref 10–12.9)
RBC # BLD: 4.71 M/UL — SIGNIFICANT CHANGE UP (ref 4.2–5.8)
RBC # FLD: 12.7 % — SIGNIFICANT CHANGE UP (ref 10.3–14.5)
SODIUM SERPL-SCNC: 145 MMOL/L — SIGNIFICANT CHANGE UP (ref 135–145)
TROPONIN T, HIGH SENSITIVITY RESULT: 34 NG/L — SIGNIFICANT CHANGE UP (ref 0–51)
TROPONIN T, HIGH SENSITIVITY RESULT: 36 NG/L — SIGNIFICANT CHANGE UP (ref 0–51)
WBC # BLD: 6.5 K/UL — SIGNIFICANT CHANGE UP (ref 3.8–10.5)
WBC # FLD AUTO: 6.5 K/UL — SIGNIFICANT CHANGE UP (ref 3.8–10.5)

## 2019-06-27 PROCEDURE — 99222 1ST HOSP IP/OBS MODERATE 55: CPT

## 2019-06-27 PROCEDURE — 99285 EMERGENCY DEPT VISIT HI MDM: CPT | Mod: 25

## 2019-06-27 PROCEDURE — 93010 ELECTROCARDIOGRAM REPORT: CPT

## 2019-06-27 PROCEDURE — 99152 MOD SED SAME PHYS/QHP 5/>YRS: CPT | Mod: GC

## 2019-06-27 PROCEDURE — 93567 NJX CAR CTH SPRVLV AORTGRPHY: CPT | Mod: GC

## 2019-06-27 PROCEDURE — 71045 X-RAY EXAM CHEST 1 VIEW: CPT | Mod: 26

## 2019-06-27 PROCEDURE — 99223 1ST HOSP IP/OBS HIGH 75: CPT

## 2019-06-27 PROCEDURE — 92928 PRQ TCAT PLMT NTRAC ST 1 LES: CPT | Mod: LD,GC

## 2019-06-27 PROCEDURE — 93455 CORONARY ART/GRFT ANGIO S&I: CPT | Mod: 26,59,GC

## 2019-06-27 RX ORDER — DEXTROSE 50 % IN WATER 50 %
25 SYRINGE (ML) INTRAVENOUS ONCE
Refills: 0 | Status: DISCONTINUED | OUTPATIENT
Start: 2019-06-27 | End: 2019-06-28

## 2019-06-27 RX ORDER — ASPIRIN/CALCIUM CARB/MAGNESIUM 324 MG
81 TABLET ORAL DAILY
Refills: 0 | Status: DISCONTINUED | OUTPATIENT
Start: 2019-06-27 | End: 2019-06-28

## 2019-06-27 RX ORDER — FENOFIBRATE,MICRONIZED 130 MG
1 CAPSULE ORAL
Qty: 0 | Refills: 0 | DISCHARGE

## 2019-06-27 RX ORDER — FENOFIBRATE,MICRONIZED 130 MG
145 CAPSULE ORAL AT BEDTIME
Refills: 0 | Status: DISCONTINUED | OUTPATIENT
Start: 2019-06-27 | End: 2019-06-28

## 2019-06-27 RX ORDER — DOCUSATE SODIUM 100 MG
100 CAPSULE ORAL DAILY
Refills: 0 | Status: DISCONTINUED | OUTPATIENT
Start: 2019-06-27 | End: 2019-06-28

## 2019-06-27 RX ORDER — METOPROLOL TARTRATE 50 MG
100 TABLET ORAL DAILY
Refills: 0 | Status: DISCONTINUED | OUTPATIENT
Start: 2019-06-27 | End: 2019-06-28

## 2019-06-27 RX ORDER — ROSUVASTATIN CALCIUM 5 MG/1
5 TABLET ORAL
Refills: 0 | Status: DISCONTINUED | OUTPATIENT
Start: 2019-06-27 | End: 2019-06-28

## 2019-06-27 RX ORDER — ASPIRIN/CALCIUM CARB/MAGNESIUM 324 MG
1 TABLET ORAL
Qty: 0 | Refills: 0 | DISCHARGE

## 2019-06-27 RX ORDER — METFORMIN HYDROCHLORIDE 850 MG/1
1 TABLET ORAL
Qty: 60 | Refills: 2
Start: 2019-06-27 | End: 2019-09-24

## 2019-06-27 RX ORDER — FUROSEMIDE 40 MG
0.5 TABLET ORAL
Qty: 0 | Refills: 0 | DISCHARGE

## 2019-06-27 RX ORDER — FINASTERIDE 5 MG/1
5 TABLET, FILM COATED ORAL DAILY
Refills: 0 | Status: DISCONTINUED | OUTPATIENT
Start: 2019-06-27 | End: 2019-06-28

## 2019-06-27 RX ORDER — FAMOTIDINE 10 MG/ML
20 INJECTION INTRAVENOUS DAILY
Refills: 0 | Status: DISCONTINUED | OUTPATIENT
Start: 2019-06-27 | End: 2019-06-28

## 2019-06-27 RX ORDER — FAMOTIDINE 10 MG/ML
1 INJECTION INTRAVENOUS
Qty: 0 | Refills: 0 | DISCHARGE

## 2019-06-27 RX ORDER — LISINOPRIL 2.5 MG/1
5 TABLET ORAL DAILY
Refills: 0 | Status: DISCONTINUED | OUTPATIENT
Start: 2019-06-27 | End: 2019-06-28

## 2019-06-27 RX ORDER — GLUCAGON INJECTION, SOLUTION 0.5 MG/.1ML
1 INJECTION, SOLUTION SUBCUTANEOUS ONCE
Refills: 0 | Status: DISCONTINUED | OUTPATIENT
Start: 2019-06-27 | End: 2019-06-28

## 2019-06-27 RX ORDER — CLOPIDOGREL BISULFATE 75 MG/1
600 TABLET, FILM COATED ORAL ONCE
Refills: 0 | Status: COMPLETED | OUTPATIENT
Start: 2019-06-27 | End: 2019-06-27

## 2019-06-27 RX ORDER — FOLIC ACID 0.8 MG
1 TABLET ORAL DAILY
Refills: 0 | Status: DISCONTINUED | OUTPATIENT
Start: 2019-06-27 | End: 2019-06-28

## 2019-06-27 RX ORDER — FINASTERIDE 5 MG/1
1 TABLET, FILM COATED ORAL
Qty: 0 | Refills: 0 | DISCHARGE

## 2019-06-27 RX ORDER — SODIUM CHLORIDE 9 MG/ML
1000 INJECTION, SOLUTION INTRAVENOUS
Refills: 0 | Status: DISCONTINUED | OUTPATIENT
Start: 2019-06-27 | End: 2019-06-28

## 2019-06-27 RX ORDER — DEXTROSE 50 % IN WATER 50 %
12.5 SYRINGE (ML) INTRAVENOUS ONCE
Refills: 0 | Status: DISCONTINUED | OUTPATIENT
Start: 2019-06-27 | End: 2019-06-28

## 2019-06-27 RX ORDER — INSULIN LISPRO 100/ML
VIAL (ML) SUBCUTANEOUS
Refills: 0 | Status: DISCONTINUED | OUTPATIENT
Start: 2019-06-27 | End: 2019-06-28

## 2019-06-27 RX ORDER — INSULIN LISPRO 100/ML
VIAL (ML) SUBCUTANEOUS AT BEDTIME
Refills: 0 | Status: DISCONTINUED | OUTPATIENT
Start: 2019-06-27 | End: 2019-06-28

## 2019-06-27 RX ORDER — CLOPIDOGREL BISULFATE 75 MG/1
75 TABLET, FILM COATED ORAL DAILY
Refills: 0 | Status: DISCONTINUED | OUTPATIENT
Start: 2019-06-27 | End: 2019-06-28

## 2019-06-27 RX ORDER — AMLODIPINE BESYLATE 2.5 MG/1
1 TABLET ORAL
Qty: 0 | Refills: 0 | DISCHARGE

## 2019-06-27 RX ORDER — METFORMIN HYDROCHLORIDE 850 MG/1
1 TABLET ORAL
Qty: 0 | Refills: 0 | DISCHARGE

## 2019-06-27 RX ORDER — SENNA PLUS 8.6 MG/1
2 TABLET ORAL AT BEDTIME
Refills: 0 | Status: DISCONTINUED | OUTPATIENT
Start: 2019-06-27 | End: 2019-06-28

## 2019-06-27 RX ORDER — ROSUVASTATIN CALCIUM 5 MG/1
0 TABLET ORAL
Qty: 0 | Refills: 0 | DISCHARGE

## 2019-06-27 RX ORDER — CLOPIDOGREL BISULFATE 75 MG/1
1 TABLET, FILM COATED ORAL
Qty: 30 | Refills: 11
Start: 2019-06-27 | End: 2020-06-20

## 2019-06-27 RX ORDER — AMLODIPINE BESYLATE 2.5 MG/1
10 TABLET ORAL ONCE
Refills: 0 | Status: COMPLETED | OUTPATIENT
Start: 2019-06-27 | End: 2019-06-27

## 2019-06-27 RX ORDER — DEXTROSE 50 % IN WATER 50 %
15 SYRINGE (ML) INTRAVENOUS ONCE
Refills: 0 | Status: DISCONTINUED | OUTPATIENT
Start: 2019-06-27 | End: 2019-06-28

## 2019-06-27 RX ORDER — ASPIRIN/CALCIUM CARB/MAGNESIUM 324 MG
81 TABLET ORAL ONCE
Refills: 0 | Status: COMPLETED | OUTPATIENT
Start: 2019-06-27 | End: 2019-06-27

## 2019-06-27 RX ORDER — FUROSEMIDE 40 MG
10 TABLET ORAL DAILY
Refills: 0 | Status: DISCONTINUED | OUTPATIENT
Start: 2019-06-27 | End: 2019-06-28

## 2019-06-27 RX ORDER — LISINOPRIL 2.5 MG/1
1 TABLET ORAL
Qty: 0 | Refills: 0 | DISCHARGE

## 2019-06-27 RX ORDER — FOLIC ACID 0.8 MG
1 TABLET ORAL
Qty: 0 | Refills: 0 | DISCHARGE

## 2019-06-27 RX ORDER — AMLODIPINE BESYLATE 2.5 MG/1
10 TABLET ORAL DAILY
Refills: 0 | Status: DISCONTINUED | OUTPATIENT
Start: 2019-06-27 | End: 2019-06-28

## 2019-06-27 RX ADMIN — Medication 81 MILLIGRAM(S): at 07:42

## 2019-06-27 RX ADMIN — CLOPIDOGREL BISULFATE 600 MILLIGRAM(S): 75 TABLET, FILM COATED ORAL at 09:03

## 2019-06-27 RX ADMIN — Medication 100 MILLIGRAM(S): at 23:33

## 2019-06-27 RX ADMIN — Medication 145 MILLIGRAM(S): at 21:41

## 2019-06-27 RX ADMIN — Medication 100 MILLIGRAM(S): at 17:43

## 2019-06-27 RX ADMIN — FINASTERIDE 5 MILLIGRAM(S): 5 TABLET, FILM COATED ORAL at 17:43

## 2019-06-27 RX ADMIN — AMLODIPINE BESYLATE 10 MILLIGRAM(S): 2.5 TABLET ORAL at 17:43

## 2019-06-27 RX ADMIN — LISINOPRIL 5 MILLIGRAM(S): 2.5 TABLET ORAL at 17:43

## 2019-06-27 RX ADMIN — ROSUVASTATIN CALCIUM 5 MILLIGRAM(S): 5 TABLET ORAL at 21:41

## 2019-06-27 RX ADMIN — AMLODIPINE BESYLATE 10 MILLIGRAM(S): 2.5 TABLET ORAL at 11:00

## 2019-06-27 RX ADMIN — FAMOTIDINE 20 MILLIGRAM(S): 10 INJECTION INTRAVENOUS at 21:42

## 2019-06-27 NOTE — ED PROVIDER NOTE - CARE PLAN
Principal Discharge DX:	Chest pain  Secondary Diagnosis:	Shortness of breath  Secondary Diagnosis:	Diaphoresis  Secondary Diagnosis:	Unstable angina

## 2019-06-27 NOTE — ED ADULT NURSE REASSESSMENT NOTE - NS ED NURSE REASSESS COMMENT FT1
Pt BP still elevated. MD Jay aware. No intervention at this time per provider due to pt going to cath lab today. Pt denies blurry vision, HA, dizziness. Will continue to monitor.

## 2019-06-27 NOTE — ED PROVIDER NOTE - OBJECTIVE STATEMENT
78 year old male with h/o CAD s/p PTCA with stenting LAD 1999, CABG x3 with AVR (bovine) 2007, HTN, dyslipidemia, mild bilateral carotid stenosis, T2DM, obesity, osteoarthritis, BPH, GERD, presents with left sided chest squeezing sensation, waxing and waning with radiation to L jaw a/w slight shortness of breath and cold sweat that started last night at 10pm and lasted until this morning although less severe this AM.  Called his cardiologist Grayson Howe (spoke with Bi Howe) who sent to ED.  Denies n/v, arm pain, palpitations, fever, chills, exertional symptoms, positional symptoms, cough, recent travel, calf pain, abdominal pain. No recent exercise intolerance.  Last echo and nuclear stress were ~2 mos ago and were normal. 78 year old male with h/o CAD s/p PTCA with stenting LAD 1999, CABG x3 with AVR (bovine) 2007, HTN, dyslipidemia, mild bilateral carotid stenosis, T2DM, obesity, osteoarthritis, BPH, GERD, presents with left sided chest squeezing sensation, waxing and waning with radiation to L jaw a/w slight shortness of breath and cold sweat that started last night at 10pm and lasted until this morning although less severe this AM.  Called his cardiologist Grayson Howe (spoke with Bi Howe) who sent to ED.  Denies n/v, arm pain, palpitations, fever, chills, exertional symptoms, positional symptoms, cough, recent travel, calf pain, abdominal pain. No recent exercise intolerance.  Last echo and nuclear stress were ~2 mos ago, nuclear stress was abnormal but unchanged from prior so was not pursued.

## 2019-06-27 NOTE — CONSULT NOTE ADULT - SUBJECTIVE AND OBJECTIVE BOX
Patient seen and evaluated @ 8am in [facility] ED  Chief Complaint: chest discomfort    HPI: 78 year-old gentleman with multiple cardiovascular risk factors including hypertension, diabetes, dyslipidemia, known CAD status post prior PCI, prior CABG and bio-AVR, no prior acute MI, now presents with intermittent palpitations and chest discomfort that occurred at rest. Chest discomfort associated with radiation to the jaw. Patient also experienced episode that he described as a cold sweat. He called me early this morning and I advised coming to [facility] ED.  Labs show normal CBC and metabolic panel with very mild transaminitis.  No events on tele since arrival.  EKG with NSR, LVH which is his baseline.    PMH:   GERD (gastroesophageal reflux disease)  Hip osteoarthritis  Asthma  Kidney calculus  Prostate enlargement  Spinal stenosis at L4-L5 level  Carotid artery calcification, right  Dyslipidemia associated with type 2 diabetes mellitus  Shortness of breath  Diabetes mellitus  Hyperlipidemia  Hypertension  Coronary artery disease    PSH:   History of PTCA  History of lithotripsy  S/P cholecystectomy  Aortic valve prosthesis present  S/P lumbar spine operation  S/P CABG x 3    Home Medications:   amLODIPine Besylate 10 MG Oral Tablet; TAKE 1 TABLET DAILY  Aspirin 81 MG TABS; TAKE 1 TABLET DAILY  CoQ10 100 MG Oral Capsule  EpiPen 2-Jose Maria 0.3 MG/0.3ML DELIA; INJECT INTRAMUSCULARLY AS DIRECTED  Famotidine 40 MG Oral Tablet; take 1 tablet at bedtime  Fenofibrate 160 MG Oral Tablet; TAKE 1 TABLET BY MOUTH ONCE DAILY  Finasteride 5 MG Oral Tablet  Fluticasone Propionate 50 MCG/ACT Nasal Suspension; INSTILL 2 SQUIRT Daily  Folic Acid 1 MG Oral Tablet; Take 1 tablet daily  Furosemide 20 MG Oral Tablet; TAKE 1 TABLET EVERY OTHER DAY  Lisinopril 5 MG Oral Tablet; TAKE 1 TABLET BY MOUTH EVERY DAY  metFORMIN HCl  MG Oral Tablet Extended Release 24 Hour; Take 1 tablet daily  Metoprolol Succinate  MG Oral Tablet Extended Release 24 Hour; Take 1 tablet daily  Multi Vitamin/Minerals TABS; TAKE 1 TABLET DAILY  OneTouch Ultra Blue In Vitro Strip; TEST ONCE A DAY AS DIRECTED  OneTouch UltraSoft Lancets; TEST ONCE A DAY AS DIRECTED  PA Vitamin D-3 5000 UNIT Oral Capsule  Potassium Chloride Abi ER 10 MEQ Oral Tablet Extended Release; TAKE 1 TABLET TWICE DAILY  Rosuvastatin Calcium 5 MG Oral Tablet; TAKE 1 TABLET EVERY OTHER DAY    Allergies:  latex (Rash)  Levaquin (Anaphylaxis)    FAMILY HISTORY: reviewed and noncontributory    Social History:   Smoking: nonsmoker  Alcohol: no EtOH abuse  Drugs: no illicit drug use    Review of Systems:    Constitutional: No fever, chills, fatigue, or changes in weight  HEENT: No blurry vision, eye pain, headache, runny nose, or sore throat  Respiratory: No shortness of breath, cough, or wheezing  Cardiovascular: +intermittent palpitations described fluttering with radiation to jawNo chest pain or palpitations  Gastrointestinal: No nausea, vomiting, diarrhea, or abdominal pain  Genitourinary: No dysuria or incontinence  Extremities: No lower extremity swelling  Neurologic: No focal findings  Lymphatic: No lymphadenopathy   Skin: No rash  Psychiatry: No anxiety or depression  10 point review of systems is otherwise negative except as mentioned above            Physical Exam:  T(C): 36.7 (06-27-19 @ 06:21), Max: 36.7 (06-27-19 @ 06:21)  HR: 60 (06-27-19 @ 06:46) (60 - 67)  BP: 192/78 (06-27-19 @ 06:46) (192/78 - 197/79)  RR: 20 (06-27-19 @ 06:46) (18 - 20)  SpO2: 97% (06-27-19 @ 06:46) (96% - 97%)  Wt(kg): --    Daily Height in cm: 170.18 (27 Jun 2019 06:21)    Daily     Appearance: Normal, well groomed, NAD  Eyes: PERRLA, EOMI, pink conjunctiva, no scleral icterus   HENT: Normal oral mucosa  Cardiovascular: RRR, S1, S2, no murmur, rub, or gallop; no edema; no JVD  Respiratory: Clear to auscultation bilaterally  Gastrointestinal: Soft, non-tender, non-distended, BS+  Musculoskeletal: No clubbing or joint deformity   Neurologic: No focal weakness  Lymphatic: No lymphadenopathy  Psychiatry: AAOx3 with appropriate mood and affect  Skin: No rashes, ecchymoses, or cyanosis    Cardiovascular Diagnostic Testing:    ECG: NSR, LVH with repolarization abnormality of strain pattern    Echo: 3/2019 - normal LV function with hypokinesis of inferolateral and lateral walls    Stress Testing: 3/2019 - inferior infarct with ischemia seen in mid-distal inferior walls and apex    Interpretation of Telemetry: NSR without ectopy    Labs:                        15.3   6.5   )-----------( 142      ( 27 Jun 2019 07:38 )             48.0     06-27    145  |  102  |  20  ----------------------------<  151<H>  4.1   |  26  |  1.07    Ca    10.0      27 Jun 2019 07:38    TPro  7.7  /  Alb  4.6  /  TBili  0.7  /  DBili  x   /  AST  56<H>  /  ALT  50<H>  /  AlkPhos  40  06-27    PT/INR - ( 27 Jun 2019 07:38 )   PT: 12.3 sec;   INR: 1.07 ratio         PTT - ( 27 Jun 2019 07:38 )  PTT:32.5 sec      Serum Pro-Brain Natriuretic Peptide: 205 pg/mL (06-27 @ 07:38)

## 2019-06-27 NOTE — ED ADULT NURSE NOTE - CHIEF COMPLAINT QUOTE
Patient c/o occasional heart" flutter" since 10 pm last night accompanied with SOB, cold sweat and belching.

## 2019-06-27 NOTE — ED PROVIDER NOTE - PHYSICAL EXAMINATION
***GEN - well appearing; NAD   ***HEAD - NC/AT  ***EYES/NOSE - PEERL, EOMI, mucous membranes moist, no discharge   ***THROAT: Oral cavity and pharynx normal. No inflammation, swelling, exudate, or lesions.    ***NECK: supple, non-tender no lymphadenopathy  ***PULMONARY - CTA b/l, symmetric breath sounds.   ***CARDIAC- s1s2, RRR, no murmur  ***ABDOMEN - +BS, ND, NT, soft, no guarding, no rebound, no organomegaly  ***BACK - no CVA tenderness, Normal  spine, no spinal TTP  ***EXTREMITIES - symmetric pulses, 2+ dp, capillary refill < 2 seconds, no clubbing, no cyanosis, 1+ non pitting edema BL LE  ***SKIN - warm, dry, intact, no rash or bruising.  +sternotomy scar, old, well healed  ***NEUROLOGIC - a&o x3, CN 3-12 intact, sensation nl, motor 5/5

## 2019-06-27 NOTE — H&P CARDIOLOGY - HISTORY OF PRESENT ILLNESS
78 year old male with h/o CAD s/p PTCA with stenting LAD 1999, CABG x3 with AVR (bovine) 2007, HTN, dyslipidemia, mild bilateral carotid stenosis, T2DM, obesity, osteoarthritis, BPH, GERD, presents with left sided chest squeezing sensation, waxing and waning with radiation to L jaw a/w slight shortness of breath and cold sweat that started last night at 10pm and lasted until this morning although less severe this AM.  Called his cardiologist Grayson Howe (spoke with Bi Howe) who sent to ED.  Denies n/v, arm pain, palpitations, fever, chills, exertional symptoms, positional symptoms, cough, recent travel, calf pain, abdominal pain. No recent exercise intolerance.  Last echo and nuclear stress were ~2 mos ago, nuclear stress was abnormal but unchanged from prior so was not pursued.	  	                77 year old male with h/o CAD s/p PTCA with stenting LAD 1999, CABG x3 with AVR (bovine) 2007, HTN, dyslipidemia, mild bilateral carotid stenosis, T2DM, obesity, osteoarthritis, BPH, GERD, presents to preadmission testing in preparation for left total hip replacement on July 30, 2018. This is a 78 year old male with h/o CAD s/p PTCA with stenting LAD 1999, CABG x3 with AVR (bovine) 2007, HTN, dyslipidemia, mild bilateral carotid stenosis, T2DM (A1C 3/14/19 AllScripts 6.4), obesity, osteoarthritis, BPH, GERD, presented to ED with left sided chest squeezing sensation, waxing and waning with radiation to L jaw associated with shortness of breath and diaphoresis that began at 10pm lasting until this morning although less severe this AM.  Pt. called his cardiologist Grayson Howe (spoke with Bi Howe) who sent to ED.  Denies n/v, arm pain, palpitations, fever, chills, exertional symptoms, positional symptoms, cough, recent travel, calf pain, abdominal pain. No recent exercise intolerance.  TTE 3/14/19 EF 53% MAC mild MR, peak Ao gradient 34 normal in presence of bioprosthetic valve, concentric LV remodeling, minimal TR, minimal TX.  NST 3/14/19 EF 56% severe fixed defect basal inferior wall, moderate predominantly reversible mid to distal infrior wall and apex, medium sized area of ischemia (unchanged from previous test therefore not pursued). Pt. was given ASA 81mg, Plavix 600mg x 1. Troponin T negative x 2. Pt. now presents asymptomatic for further evaluation and cardiac cath. This is a 78 year old male with h/o CAD s/p PTCA with stenting LAD 1999, CABG x3 with AVR (bovine) 2007, HTN, dyslipidemia, mild bilateral carotid stenosis, T2DM (A1C 3/14/19 AllScripts 6.4), obesity, osteoarthritis, BPH, GERD, presented to ED with nonradiating intermittent left sided chest "flutter" sensation, associated with shortness of breath and diaphoresis at rest that began at 10pm lasting until this morning although less severe this AM.  Pt. called his cardiologist Grayson Howe (spoke with Bi Hwoe) who sent to ED.  Denies n/v, arm pain, fever, chills, exertional symptoms, positional symptoms, cough, recent travel, calf pain, abdominal pain. No recent exercise intolerance.  TTE 3/14/19 EF 53% MAC mild MR, peak Ao gradient 34 normal in presence of bioprosthetic valve, concentric LV remodeling, minimal TR, minimal MI.  NST 3/14/19 EF 56% severe fixed defect basal inferior wall, moderate predominantly reversible mid to distal infrior wall and apex, medium sized area of ischemia (unchanged from previous test therefore not pursued). Pt. was given ASA 81mg, Plavix 600mg x 1. Troponin T negative x 2. Pt. now presents asymptomatic for further evaluation and cardiac cath.

## 2019-06-27 NOTE — DISCHARGE NOTE PROVIDER - NSDCCPTREATMENT_GEN_ALL_CORE_FT
PRINCIPAL PROCEDURE  Procedure: Left heart cardiac catheterization  Findings and Treatment: RALPH x 2 LAD

## 2019-06-27 NOTE — CONSULT NOTE ADULT - ASSESSMENT
78 year-old with known CAD and abnormal nuclear stress test in March 2019 presents with symptoms concerning for unstable angina.  No events of events on tele monitor.  Plan for [facility] today. Can load with Plavix 600 mg x1.  Continue ASA and statin therapy.  Continue beta-blocker.    Discussed with ER attending and interventional cardiologist, Marcus Burr MD.

## 2019-06-27 NOTE — DISCHARGE NOTE PROVIDER - CARE PROVIDER_API CALL
Marcus Burr)  Cardiovascular Disease; Interventional Cardiology  [address] [place] [address]  Phone: [phone]  Fax: [phone]  Follow Up Time: Grayson Howe (MD)  Cardiology; Internal Medicine  [address] [facility] [address]  [place] [address]  Phone: [phone]  Fax: [phone]  Follow Up Time: 2 weeks

## 2019-06-27 NOTE — ED ADULT TRIAGE NOTE - CHIEF COMPLAINT QUOTE
Patient c/o occasional heart flutter since 10 pm last night accompanied with SOB, cold sweat and belching. Patient c/o occasional heart" flutter" since 10 pm last night accompanied with SOB, cold sweat and belching.

## 2019-06-27 NOTE — ED PROVIDER NOTE - ATTENDING CONTRIBUTION TO CARE
Attending MD Gee:  I personally have seen and examined this patient.  Resident note reviewed and agree on plan of care and except where noted.  See MDM for details.

## 2019-06-27 NOTE — DISCHARGE NOTE PROVIDER - CARE PROVIDERS DIRECT ADDRESSES
[email or web address]

## 2019-06-27 NOTE — DISCHARGE NOTE PROVIDER - NSDCCAREPROVSEEN_GEN_ALL_CORE_FT
Marcus Burr Xenograft Text: The defect edges were debeveled with a #15 scalpel blade.  Given the location of the defect, shape of the defect and the proximity to free margins a xenograft was deemed most appropriate.  The graft was then trimmed to fit the size of the defect.  The graft was then placed in the primary defect and oriented appropriately.

## 2019-06-27 NOTE — ED ADULT NURSE NOTE - OBJECTIVE STATEMENT
78y male with hx of htn, hld, cardiac stents and open heart sx presents to the ER c/o palpitations since last night. pt is alert and oriented x 4 and speaking coherently. pt ambulatory. pt states since last night every twenty seconds or so he has felt a "fluttering" sensation in his chest. Pt states that he normally feels a pounding in his chest, but this fluttering sensation is new. Pt denies chest pain, n/v/d, fevers, chills. pt well appearing. Pt denies taking any daily meds this am. Pt hypertensive in ER. Pt denies ha, blurry vision. ekg completed. pt on cm. Will reassess. 78y male with hx of htn, hld, cardiac stents and open heart sx presents to the ER c/o palpitations since last night. pt is alert and oriented x 4 and speaking coherently. pt ambulatory. pt states since last night every twenty seconds or so he has felt a "fluttering" sensation in his chest. Pt states that he normally feels a pounding in his chest, but this fluttering sensation is new. Pt denies chest pain, n/v/d, fevers, chills. pt c/o sob with fluttering, respirations non labored. pt well appearing. Pt denies taking any daily meds this am. Pt hypertensive in ER. Pt denies ha, blurry vision. ekg completed. pt on cm. Will reassess.

## 2019-06-27 NOTE — DISCHARGE NOTE PROVIDER - NSDCCPCAREPLAN_GEN_ALL_CORE_FT
PRINCIPAL DISCHARGE DIAGNOSIS  Diagnosis: Chest pain  Assessment and Plan of Treatment: Pt remains chest pain free and understands post cath discharge instructions   No heavy lifting, strenuous activity, bending, straining or unnecessary stair climbing  for 2 weeks. No sex for 1 week.  No driving for 2 days. You may shower 24 hours following procedure but avoid baths and swimming for 1 week. Check groin site for bleeding and/or swelling daily following procedure. Call your doctor/cardiologist immediately should it occur or if you have increased/persistent pain at the site. Follow up with your cardiologist in 1- 2 weeks. You may call [facility] at [phone] or [phone] after office hours and weekends  with any questions or concerns following your procedure. Take medications as prescribed.      SECONDARY DISCHARGE DIAGNOSES  Diagnosis: Diabetes mellitus  Assessment and Plan of Treatment: Your hemoglobin A1C will be between 7-8   Continue to follow with your primary care MD or your endocrinologist.  Follow a heart healthy diabetic diet. If you check your fingerstick glucose at home, call your MD if it is greater than 250mg/dL on 2 occasions or less than 100mg/dL on 2 occasions. Know signs of low blood sugar, such as: dizziness, shakiness, sweating, confusion, hunger, nervousness-drink 4 ounces apple juice if occurs and call your doctor. Know early signs of high blood sugar, such as: frequent urination, increased thirst, blurry vision, fatigue, headache - call your doctor if this occurs. Follow with other practitioners to care for your diabetes, such as ophthamologist and podiatrist.    Diagnosis: Unstable angina  Assessment and Plan of Treatment:     Diagnosis: Diaphoresis  Assessment and Plan of Treatment:     Diagnosis: Shortness of breath  Assessment and Plan of Treatment: H/O SOB 1999 prompted angiogram with coronary stenting x 1 (LAD)

## 2019-06-27 NOTE — ED PROVIDER NOTE - CLINICAL SUMMARY MEDICAL DECISION MAKING FREE TEXT BOX
78 year old male with h/o CAD s/p PTCA with stenting LAD 1999, CABG x3 with AVR (bovine) 2007, HTN, dyslipidemia, mild bilateral carotid stenosis, T2DM, obesity, osteoarthritis, BPH, GERD, presents with left sided chest squeezing sensation, waxing and waning with radiation to L jaw a/w slight shortness of breath and cold sweat that started last night at 10pm and lasted until this morning although less severe this AM.  Sx concerning for ACS vs. GERD vs URI.  Low suspicion for aortic pathology at this time given pt's symptomatology and appearance.  EKG concerning with non-specific repolarization abnormalities in aVR, inferior leads and elieser lateral leads.  repeat Unchanged.  No old ekg found in emr. 78 year old male with h/o CAD s/p PTCA with stenting LAD 1999, CABG x3 with AVR (bovine) 2007, HTN, dyslipidemia, mild bilateral carotid stenosis, T2DM, obesity, osteoarthritis, BPH, GERD, presents with left sided chest squeezing sensation, waxing and waning with radiation to L jaw a/w slight shortness of breath and cold sweat that started last night at 10pm and lasted until this morning although less severe this AM.  Sx concerning for ACS vs. GERD vs URI.  Low suspicion for aortic pathology at this time given pt's symptomatology and appearance.  EKG concerning with non-specific repolarization abnormalities in aVR, inferior leads and elieser lateral leads.  repeat Unchanged.  No old ekg found in emr.    Attending MD Gee:  78 year old male with h/o CAD s/p PTCA with stenting LAD 1999, CABG x3 with AVR (bovine) 2007, HTN, dyslipidemia, mild bilateral carotid stenosis, T2DM, obesity, osteoarthritis, BPH, GERD, presents with left sided chest squeezing sensation, waxing and waning with radiation to L jaw a/w slight shortness of breath and cold sweat that started last night at 10pm and lasted until this morning although less severe this AM. Nuclear stress and echo 2 months ago and was normal. Exam: A & O x 3, NAD, HEENT WNL and no facial asymmetry; lungs CTAB, heart with reg rhythm without murmur; abdomen soft NTND; extremities with no edema; skin with no rashes, neuro exam non focal with no motor or sensory deficits. Plan:  SRINI, labs, troponin, tele monitor, Ekg, chest xray. 78 year old male with h/o CAD s/p PTCA with stenting LAD 1999, CABG x3 with AVR (bovine) 2007, HTN, dyslipidemia, mild bilateral carotid stenosis, T2DM, obesity, osteoarthritis, BPH, GERD, presents with left sided chest squeezing sensation, waxing and waning with radiation to L jaw a/w slight shortness of breath and cold sweat that started last night at 10pm and lasted until this morning although less severe this AM.  Last echo and nuclear stress were ~2 mos ago, nuclear stress was abnormal but unchanged from prior so was not pursued.  Sx concerning for ACS vs. GERD vs URI.  Low suspicion for aortic pathology at this time given pt's symptomatology and appearance.  EKG concerning with non-specific repolarization abnormalities in aVR, inferior leads and elieser lateral leads.  repeat Unchanged.  No old ekg found in emr.    Attending MD Gee:  78 year old male with h/o CAD s/p PTCA with stenting LAD 1999, CABG x3 with AVR (bovine) 2007, HTN, dyslipidemia, mild bilateral carotid stenosis, T2DM, obesity, osteoarthritis, BPH, GERD, presents with left sided chest squeezing sensation, waxing and waning with radiation to L jaw a/w slight shortness of breath and cold sweat that started last night at 10pm and lasted until this morning although less severe this AM. Nuclear stress and echo 2 months ago and was normal. Exam: A & O x 3, NAD, HEENT WNL and no facial asymmetry; lungs CTAB, heart with reg rhythm without murmur; abdomen soft NTND; extremities with no edema; skin with no rashes, neuro exam non focal with no motor or sensory deficits. Plan:  SRINI, labs, troponin, tele monitor, Ekg, chest xray. 78 year old male with h/o CAD s/p PTCA with stenting LAD 1999, CABG x3 with AVR (bovine) 2007, HTN, dyslipidemia, mild bilateral carotid stenosis, T2DM, obesity, osteoarthritis, BPH, GERD, presents with left sided chest squeezing sensation, waxing and waning with radiation to L jaw a/w slight shortness of breath and cold sweat that started last night at 10pm and lasted until this morning although less severe this AM.  Last echo and nuclear stress were ~2 mos ago, nuclear stress was abnormal but unchanged from prior so was not pursued.  Sx concerning for ACS vs. GERD vs URI.  Low suspicion for aortic pathology at this time given pt's symptomatology and appearance.  EKG concerning with non-specific repolarization abnormalities in aVR, inferior leads and elieser lateral leads.  repeat Unchanged.  No old ekg found in emr.    Attending MD Gee:  78 year old male with h/o CAD s/p PTCA with stenting LAD 1999, CABG x3 with AVR (bovine) 2007, HTN, dyslipidemia, mild bilateral carotid stenosis, T2DM, obesity, osteoarthritis, BPH, GERD, presents with left sided chest squeezing sensation, waxing and waning with radiation to L jaw a/w slight shortness of breath and cold sweat that started last night at 10pm and lasted until this morning although less severe this AM. Nuclear stress and echo 2 months ago and was abnormal but not different from prior stress. Exam: A & O x 3, NAD, HEENT WNL and no facial asymmetry; lungs CTAB, heart with reg rhythm without murmur; abdomen soft NTND; extremities with no edema; skin with no rashes, neuro exam non focal with no motor or sensory deficits. Plan:  SRINI, labs, troponin, tele monitor, Ekg, chest xray.

## 2019-06-27 NOTE — DISCHARGE NOTE PROVIDER - HOSPITAL COURSE
This is a 78 year old male with h/o CAD s/p PTCA with stenting LAD 1999, CABG x3 with AVR (bovine) 2007, HTN, dyslipidemia, mild bilateral carotid stenosis, T2DM (A1C 3/14/19 AllScripts 6.4), obesity, osteoarthritis, BPH, GERD, presented to ED with nonradiating intermittent left sided chest "flutter" sensation, associated with shortness of breath and diaphoresis at rest that began at 10pm lasting until this morning although less severe this AM.  Pt. called his cardiologist Grayson Howe (spoke with Bi Howe) who sent to ED.  Denies n/v, arm pain, fever, chills, exertional symptoms, positional symptoms, cough, recent travel, calf pain, abdominal pain. No recent exercise intolerance.  TTE 3/14/19 EF 53% MAC mild MR, peak Ao gradient 34 normal in presence of bioprosthetic valve, concentric LV remodeling, minimal TR, minimal [place].  NST 3/14/19 EF 56% severe fixed defect basal inferior wall, moderate predominantly reversible mid to distal infrior wall and apex, medium sized area of ischemia (unchanged from previous test therefore not pursued). Pt. was given ASA 81mg, Plavix 600mg x 1. Troponin T negative x 2. Pt. now presents asymptomatic for further evaluation and cardiac cath. Pt is now s/p LHC RALPH x 2 to LAD. Pt tolerated the procedure well, site benign. Post-procedure discharge instructions discussed and questions addressed

## 2019-06-27 NOTE — CHART NOTE - NSCHARTNOTEFT_GEN_A_CORE
Removal of Femoral Sheath    Pulses in the (right) lower extremity are (palpable). The patient was placed in the supine position. The insertion site was identified and the sutures were removed per protocol.  The ___6_ [place] femoral sheath was then removed. Direct pressure was applied for  ___20___ minutes.     Monitoring of the (right) groin and both lower extremities including neuro-vascular checks and vital signs every 15 minutes x 4, then every 30 minutes x 2, then every 1 hour was ordered.    Complications: None    Comments:

## 2019-06-28 ENCOUNTER — TRANSCRIPTION ENCOUNTER (OUTPATIENT)
Age: 79
End: 2019-06-28

## 2019-06-28 ENCOUNTER — INBOUND DOCUMENT (OUTPATIENT)
Age: 79
End: 2019-06-28

## 2019-06-28 VITALS
HEART RATE: 64 BPM | TEMPERATURE: 98 F | OXYGEN SATURATION: 97 % | SYSTOLIC BLOOD PRESSURE: 120 MMHG | DIASTOLIC BLOOD PRESSURE: 73 MMHG | RESPIRATION RATE: 17 BRPM

## 2019-06-28 LAB
ANION GAP SERPL CALC-SCNC: 16 MMOL/L — SIGNIFICANT CHANGE UP (ref 5–17)
BUN SERPL-MCNC: 17 MG/DL — SIGNIFICANT CHANGE UP (ref 7–23)
CALCIUM SERPL-MCNC: 9.3 MG/DL — SIGNIFICANT CHANGE UP (ref 8.4–10.5)
CHLORIDE SERPL-SCNC: 101 MMOL/L — SIGNIFICANT CHANGE UP (ref 96–108)
CO2 SERPL-SCNC: 21 MMOL/L — LOW (ref 22–31)
CREAT SERPL-MCNC: 0.88 MG/DL — SIGNIFICANT CHANGE UP (ref 0.5–1.3)
GLUCOSE BLDC GLUCOMTR-MCNC: 131 MG/DL — HIGH (ref 70–99)
GLUCOSE SERPL-MCNC: 135 MG/DL — HIGH (ref 70–99)
HCT VFR BLD CALC: 44.1 % — SIGNIFICANT CHANGE UP (ref 39–50)
HGB BLD-MCNC: 15.4 G/DL — SIGNIFICANT CHANGE UP (ref 13–17)
MCHC RBC-ENTMCNC: 34.9 GM/DL — SIGNIFICANT CHANGE UP (ref 32–36)
MCHC RBC-ENTMCNC: 35.1 PG — HIGH (ref 27–34)
MCV RBC AUTO: 101 FL — HIGH (ref 80–100)
PLATELET # BLD AUTO: 149 K/UL — LOW (ref 150–400)
POTASSIUM SERPL-MCNC: 3.5 MMOL/L — SIGNIFICANT CHANGE UP (ref 3.5–5.3)
POTASSIUM SERPL-SCNC: 3.5 MMOL/L — SIGNIFICANT CHANGE UP (ref 3.5–5.3)
RBC # BLD: 4.38 M/UL — SIGNIFICANT CHANGE UP (ref 4.2–5.8)
RBC # FLD: 12.5 % — SIGNIFICANT CHANGE UP (ref 10.3–14.5)
SODIUM SERPL-SCNC: 138 MMOL/L — SIGNIFICANT CHANGE UP (ref 135–145)
WBC # BLD: 9.1 K/UL — SIGNIFICANT CHANGE UP (ref 3.8–10.5)
WBC # FLD AUTO: 9.1 K/UL — SIGNIFICANT CHANGE UP (ref 3.8–10.5)

## 2019-06-28 PROCEDURE — 83880 ASSAY OF NATRIURETIC PEPTIDE: CPT

## 2019-06-28 PROCEDURE — C1874: CPT

## 2019-06-28 PROCEDURE — 85730 THROMBOPLASTIN TIME PARTIAL: CPT

## 2019-06-28 PROCEDURE — 80053 COMPREHEN METABOLIC PANEL: CPT

## 2019-06-28 PROCEDURE — C1887: CPT

## 2019-06-28 PROCEDURE — 71045 X-RAY EXAM CHEST 1 VIEW: CPT

## 2019-06-28 PROCEDURE — C1725: CPT

## 2019-06-28 PROCEDURE — 84484 ASSAY OF TROPONIN QUANT: CPT

## 2019-06-28 PROCEDURE — 99285 EMERGENCY DEPT VISIT HI MDM: CPT | Mod: 25

## 2019-06-28 PROCEDURE — 99152 MOD SED SAME PHYS/QHP 5/>YRS: CPT

## 2019-06-28 PROCEDURE — 93567 NJX CAR CTH SPRVLV AORTGRPHY: CPT

## 2019-06-28 PROCEDURE — 93455 CORONARY ART/GRFT ANGIO S&I: CPT | Mod: 59

## 2019-06-28 PROCEDURE — 99153 MOD SED SAME PHYS/QHP EA: CPT

## 2019-06-28 PROCEDURE — 83036 HEMOGLOBIN GLYCOSYLATED A1C: CPT

## 2019-06-28 PROCEDURE — 82962 GLUCOSE BLOOD TEST: CPT

## 2019-06-28 PROCEDURE — C1769: CPT

## 2019-06-28 PROCEDURE — C1894: CPT

## 2019-06-28 PROCEDURE — 80048 BASIC METABOLIC PNL TOTAL CA: CPT

## 2019-06-28 PROCEDURE — 85610 PROTHROMBIN TIME: CPT

## 2019-06-28 PROCEDURE — 85027 COMPLETE CBC AUTOMATED: CPT

## 2019-06-28 PROCEDURE — C9600: CPT | Mod: LD

## 2019-06-28 PROCEDURE — 93005 ELECTROCARDIOGRAM TRACING: CPT | Mod: 76

## 2019-06-28 RX ORDER — METFORMIN HYDROCHLORIDE 850 MG/1
1 TABLET ORAL
Qty: 0 | Refills: 0 | DISCHARGE

## 2019-06-28 RX ORDER — POTASSIUM CHLORIDE 20 MEQ
40 PACKET (EA) ORAL ONCE
Refills: 0 | Status: COMPLETED | OUTPATIENT
Start: 2019-06-28 | End: 2019-06-28

## 2019-06-28 RX ADMIN — Medication 40 MILLIEQUIVALENT(S): at 09:51

## 2019-06-28 RX ADMIN — AMLODIPINE BESYLATE 10 MILLIGRAM(S): 2.5 TABLET ORAL at 05:20

## 2019-06-28 RX ADMIN — Medication 81 MILLIGRAM(S): at 05:20

## 2019-06-28 RX ADMIN — Medication 100 MILLIGRAM(S): at 05:20

## 2019-06-28 RX ADMIN — LISINOPRIL 5 MILLIGRAM(S): 2.5 TABLET ORAL at 05:20

## 2019-06-28 RX ADMIN — CLOPIDOGREL BISULFATE 75 MILLIGRAM(S): 75 TABLET, FILM COATED ORAL at 05:20

## 2019-06-28 RX ADMIN — Medication 10 MILLIGRAM(S): at 05:20

## 2019-06-28 NOTE — PROGRESS NOTE ADULT - SUBJECTIVE AND OBJECTIVE BOX
Patient is a 78y old  Male who presents with a chief complaint of CAD (2019 21:52) now s/p LHC RALPH X 2 via RFA           Allergies    latex (Rash)  Levaquin (Anaphylaxis)    Intolerances    statins (Muscle Pain; Joint Pain)      Medications:  amLODIPine   Tablet 10 milliGRAM(s) Oral daily  aspirin enteric coated 81 milliGRAM(s) Oral daily  clopidogrel Tablet 75 milliGRAM(s) Oral daily  dextrose 40% Gel 15 Gram(s) Oral once PRN  dextrose 5%. 1000 milliLiter(s) IV Continuous <Continuous>  dextrose 50% Injectable 12.5 Gram(s) IV Push once  dextrose 50% Injectable 25 Gram(s) IV Push once  dextrose 50% Injectable 25 Gram(s) IV Push once  docusate sodium 100 milliGRAM(s) Oral daily  famotidine    Tablet 20 milliGRAM(s) Oral daily  fenofibrate Tablet 145 milliGRAM(s) Oral at bedtime  finasteride 5 milliGRAM(s) Oral daily  folic acid 1 milliGRAM(s) Oral daily  furosemide    Tablet 10 milliGRAM(s) Oral daily  glucagon  Injectable 1 milliGRAM(s) IntraMuscular once PRN  insulin lispro (HumaLOG) corrective regimen sliding scale   SubCutaneous three times a day before meals  insulin lispro (HumaLOG) corrective regimen sliding scale   SubCutaneous at bedtime  lisinopril 5 milliGRAM(s) Oral daily  metoprolol succinate  milliGRAM(s) Oral daily  rosuvastatin 5 milliGRAM(s) Oral <User Schedule>  senna 2 Tablet(s) Oral at bedtime PRN      Vitals:  T(C): 36.6 (19 @ 19:20), Max: 36.9 (19 @ 09:26)  HR: 68 (19 @ 22:20) (60 - 75)  BP: 152/69 (19 @ 22:20) (112/64 - 197/79)  BP(mean): 122 (19 @ 09:59) (122 - 122)  RR: 17 (19 @ 22:20) (17 - 20)  SpO2: 94% (19 @ 22:20) (92% - 98%)  Wt(kg): --  Daily Height in cm: 170.18 (2019 09:59)    Daily Weight in k.6 (2019 09:59)  I&O's Summary    2019 07:01  -  2019 04:59  --------------------------------------------------------  IN: 240 mL / OUT: 300 mL / NET: -60 mL          Physical Exam:  Appearance: Normal  Procedural Access Site: RFA access  No hematoma, Non-tender to palpation, 2+ pulse, No bruit, No Ecchymosis  Respiratory: Clear to auscultation bilaterally  Psychiatry: AAOx3, Mood & affect appropriate  Skin: No rashes, No ecchymoses, No cyanosis        138  |  101  |  17  ----------------------------<  135<H>  3.5   |  21<L>  |  0.88    Ca    9.3      2019 00:25    TPro  7.7  /  Alb  4.6  /  TBili  0.7  /  DBili  x   /  AST  56<H>  /  ALT  50<H>  /  AlkPhos  40      PT/INR - ( 2019 07:38 )   PT: 12.3 sec;   INR: 1.07 ratio         PTT - ( 2019 07:38 )  PTT:32.5 sec      Serum Pro-Brain Natriuretic Peptide: 205 pg/mL ( @ 07:38)    Lipid panel   Hgb A1c Hemoglobin A1C, Whole Blood: 6.2 % ( @ 13:57)        Interpretation of Telemetry:

## 2019-06-28 NOTE — PROGRESS NOTE ADULT - ASSESSMENT
Patient is a 78y old  Male who presents with a chief complaint of CAD (27 Jun 2019 21:52) now s/p LHC RALPH X 2 via RFA. Pt tolerated the procedure well, site benign. Overnight remained uneventful. Post-procedure discharge instructions discuss and questions addressed

## 2019-06-28 NOTE — DISCHARGE NOTE NURSING/CASE MANAGEMENT/SOCIAL WORK - NSDCDPATPORTLINK_GEN_ALL_CORE
You can access the [facility] Portal, offered by [facility], by registering with the following website: [email or web address]

## 2019-07-09 ENCOUNTER — NON-APPOINTMENT (OUTPATIENT)
Age: 79
End: 2019-07-09

## 2019-07-09 ENCOUNTER — APPOINTMENT (OUTPATIENT)
Dept: CARDIOLOGY | Facility: CLINIC | Age: 79
End: 2019-07-09
Payer: MEDICARE

## 2019-07-09 VITALS
OXYGEN SATURATION: 95 % | HEIGHT: 67 IN | SYSTOLIC BLOOD PRESSURE: 193 MMHG | HEART RATE: 71 BPM | BODY MASS INDEX: 35.16 KG/M2 | WEIGHT: 224 LBS | DIASTOLIC BLOOD PRESSURE: 78 MMHG

## 2019-07-09 VITALS — SYSTOLIC BLOOD PRESSURE: 166 MMHG | DIASTOLIC BLOOD PRESSURE: 76 MMHG

## 2019-07-09 DIAGNOSIS — Z98.62 PERIPHERAL VASCULAR ANGIOPLASTY STATUS: ICD-10-CM

## 2019-07-09 PROCEDURE — 99214 OFFICE O/P EST MOD 30 MIN: CPT

## 2019-07-09 PROCEDURE — 93000 ELECTROCARDIOGRAM COMPLETE: CPT

## 2019-07-09 NOTE — PHYSICAL EXAM
[Normal Appearance] : normal appearance [General Appearance - Well Developed] : well developed [Well Groomed] : well groomed [General Appearance - Well Nourished] : well nourished [No Deformities] : no deformities [General Appearance - In No Acute Distress] : no acute distress [Normal Conjunctiva] : the conjunctiva exhibited no abnormalities [Eyelids - No Xanthelasma] : the eyelids demonstrated no xanthelasmas [Normal Oral Mucosa] : normal oral mucosa [No Oral Pallor] : no oral pallor [No Oral Cyanosis] : no oral cyanosis [Normal Jugular Venous A Waves Present] : normal jugular venous A waves present [No Jugular Venous Steward A Waves] : no jugular venous steward A waves [Normal Jugular Venous V Waves Present] : normal jugular venous V waves present [Respiration, Rhythm And Depth] : normal respiratory rhythm and effort [Exaggerated Use Of Accessory Muscles For Inspiration] : no accessory muscle use [Auscultation Breath Sounds / Voice Sounds] : lungs were clear to auscultation bilaterally [Heart Rate And Rhythm] : heart rate and rhythm were normal [Heart Sounds] : normal S1 and S2 [Abdomen Tenderness] : non-tender [Abdomen Soft] : soft [Abdomen Mass (___ Cm)] : no abdominal mass palpated [Nail Clubbing] : no clubbing of the fingernails [Cyanosis, Localized] : no localized cyanosis [Petechial Hemorrhages (___cm)] : no petechial hemorrhages [Skin Color & Pigmentation] : normal skin color and pigmentation [] : no rash [No Venous Stasis] : no venous stasis [No Skin Ulcers] : no skin ulcer [Skin Lesions] : no skin lesions [No Xanthoma] : no  xanthoma was observed [Oriented To Time, Place, And Person] : oriented to person, place, and time [Affect] : the affect was normal [No Anxiety] : not feeling anxious [Mood] : the mood was normal [FreeTextEntry1] : Walks with a cane.

## 2019-07-09 NOTE — DISCUSSION/SUMMARY
[FreeTextEntry1] : The patient was examined. His  blood pressure was 166/76, and  his pulse was 71. His lungs were clear to auscultation. Cardiac exam reveal a 3/6 systolic ejection murmur in the aortic area. There was also a left carotid bruit. His EKG showed normal sinus rhythm, and nonspecific ST and T wave changes. No acute changes were seen. He will continue his current medication, I  He'll return in 1 month, or earlier if needed.

## 2019-07-09 NOTE — REASON FOR VISIT
[FreeTextEntry1] : The patient comes in for followup of his atherosclerotic heart disease, aortic valve disease, hypertension, mitral and aortic insufficiency, and spinal stenosis. He denies any chest pains, or palpitations.He has had 3 or 4 episodes of shortness of breath. In addition he has had episodes where he just falls asleep during the day. Is not sleeping well at night. He had his hip surgery and although doesn't hurt while walking it hurts sometimes at night.\par July 9, 2019: One week ago the patient had a thumping in his left chest and broke into a sweat. He went to the emergency room at [facility] and underwent cardiac catheterization with a placed 2 drug eluting stents in his left anterior descending artery. Since coming out of the hospital he has not had any chest pains, shortness of breath, or palpitations.

## 2019-07-12 ENCOUNTER — APPOINTMENT (OUTPATIENT)
Dept: INTERNAL MEDICINE | Facility: CLINIC | Age: 79
End: 2019-07-12
Payer: MEDICARE

## 2019-07-12 VITALS
WEIGHT: 228 LBS | DIASTOLIC BLOOD PRESSURE: 76 MMHG | BODY MASS INDEX: 35.79 KG/M2 | HEIGHT: 67 IN | TEMPERATURE: 98.3 F | SYSTOLIC BLOOD PRESSURE: 130 MMHG

## 2019-07-12 DIAGNOSIS — K21.9 GASTRO-ESOPHAGEAL REFLUX DISEASE W/OUT ESOPHAGITIS: ICD-10-CM

## 2019-07-12 DIAGNOSIS — E66.3 OVERWEIGHT: ICD-10-CM

## 2019-07-12 PROCEDURE — 99496 TRANSJ CARE MGMT HIGH F2F 7D: CPT

## 2019-07-12 NOTE — HISTORY OF PRESENT ILLNESS
[Admitted on: ___] : The patient was admitted on [unfilled] [Post-hospitalization from ___ Hospital] : Post-hospitalization from [unfilled] Hospital [Discharged on ___] : discharged on [unfilled] [FreeTextEntry2] : Cath with 2 stents  Dr Burr\par neg cst and echo 2 months ago\par cardiologist Dr Howe\par some abdominal discomfort near umblical hernia\par hip problems\par has tolerated statin poor\par on crestor 5 twice a week with ldl 114\par

## 2019-07-12 NOTE — PHYSICAL EXAM
[No Lymphadenopathy] : no lymphadenopathy [No Respiratory Distress] : no respiratory distress  [Normal Oropharynx] : the oropharynx was normal [No JVD] : no jugular venous distention [Normal Rate] : normal rate  [No Accessory Muscle Use] : no accessory muscle use [No HSM] : no HSM [Soft] : abdomen soft [Regular Rhythm] : with a regular rhythm [28394 - Moderate Complexity requires multiple possible diagnoses and/or the management options, moderate complexity of the medical data (tests, etc.) to be reviewed, and moderate risk of significant complications, morbidity, and/or mortality as well as co] : Moderate Complexity

## 2019-07-12 NOTE — PLAN
[FreeTextEntry1] : s/p stent feeling better\par increase rosuvastatin from twice weekly to every other day\par need f/u blood tests 6 weeks\par goal ldl 70-80\par consider injectables

## 2019-07-12 NOTE — REVIEW OF SYSTEMS
[Abdominal Pain] : abdominal pain [Night Sweats] : no night sweats [Fever] : no fever [Earache] : no earache [Nasal Discharge] : no nasal discharge [Vomiting] : no vomiting [Orthopena] : no orthopnea [Chest Pain] : no chest pain

## 2019-07-17 ENCOUNTER — RX RENEWAL (OUTPATIENT)
Age: 79
End: 2019-07-17

## 2019-08-12 ENCOUNTER — NON-APPOINTMENT (OUTPATIENT)
Age: 79
End: 2019-08-12

## 2019-08-12 ENCOUNTER — APPOINTMENT (OUTPATIENT)
Dept: CARDIOLOGY | Facility: CLINIC | Age: 79
End: 2019-08-12
Payer: MEDICARE

## 2019-08-12 VITALS
BODY MASS INDEX: 36.1 KG/M2 | HEART RATE: 66 BPM | SYSTOLIC BLOOD PRESSURE: 146 MMHG | WEIGHT: 230 LBS | HEIGHT: 67 IN | DIASTOLIC BLOOD PRESSURE: 74 MMHG | OXYGEN SATURATION: 95 %

## 2019-08-12 DIAGNOSIS — Z95.5 PRESENCE OF CORONARY ANGIOPLASTY IMPLANT AND GRAFT: ICD-10-CM

## 2019-08-12 PROCEDURE — 99214 OFFICE O/P EST MOD 30 MIN: CPT

## 2019-08-12 PROCEDURE — 93000 ELECTROCARDIOGRAM COMPLETE: CPT

## 2019-08-12 NOTE — DISCUSSION/SUMMARY
[FreeTextEntry1] : The patient was examined. His  blood pressure was 146/74, and  his pulse was 66. His lungs were clear to auscultation. Cardiac exam reveal a 3/6 systolic ejection murmur in the aortic area. There was also a left carotid bruit. His EKG showed normal sinus rhythm, and nonspecific ST and T wave changes. No acute changes were seen. He will continue his current medication, I  He'll return in 3 months, or earlier if needed.

## 2019-08-12 NOTE — REASON FOR VISIT
[FreeTextEntry1] : The patient comes in for followup of his atherosclerotic heart disease, aortic valve disease, hypertension, mitral and aortic insufficiency, and spinal stenosis. He denies any chest pains, or palpitations.He has had 3 or 4 episodes of shortness of breath. In addition he has had episodes where he just falls asleep during the day. Is not sleeping well at night. He had his hip surgery and although doesn't hurt while walking it hurts sometimes at night.\par July 9, 2019: One week ago the patient had a thumping in his left chest and broke into a sweat. He went to the emergency room at [place] and underwent cardiac catheterization with a placed 2 drug eluting stents in his left anterior descending artery. Since coming out of the hospital he has not had any chest pains, shortness of breath, or palpitations.\par  August 12, 2019: The patient has not had any chest pains, shortness breath, or palpitations since the stent. He still gets some slight dyspnea on exertion when going up stairs.\par

## 2019-08-21 ENCOUNTER — APPOINTMENT (OUTPATIENT)
Dept: CARDIOLOGY | Facility: CLINIC | Age: 79
End: 2019-08-21

## 2019-09-01 ENCOUNTER — RX RENEWAL (OUTPATIENT)
Age: 79
End: 2019-09-01

## 2019-09-03 ENCOUNTER — MEDICATION RENEWAL (OUTPATIENT)
Age: 79
End: 2019-09-03

## 2019-09-04 ENCOUNTER — APPOINTMENT (OUTPATIENT)
Dept: INTERNAL MEDICINE | Facility: CLINIC | Age: 79
End: 2019-09-04
Payer: MEDICARE

## 2019-09-04 VITALS
BODY MASS INDEX: 36.73 KG/M2 | HEART RATE: 65 BPM | OXYGEN SATURATION: 95 % | TEMPERATURE: 98.2 F | HEIGHT: 67 IN | WEIGHT: 234 LBS

## 2019-09-04 VITALS
DIASTOLIC BLOOD PRESSURE: 70 MMHG | SYSTOLIC BLOOD PRESSURE: 140 MMHG | WEIGHT: 230 LBS | BODY MASS INDEX: 36.1 KG/M2 | HEART RATE: 76 BPM | HEIGHT: 67 IN

## 2019-09-04 DIAGNOSIS — Z96.642 PRESENCE OF LEFT ARTIFICIAL HIP JOINT: ICD-10-CM

## 2019-09-04 DIAGNOSIS — E78.5 HYPERLIPIDEMIA, UNSPECIFIED: ICD-10-CM

## 2019-09-04 PROCEDURE — G0439: CPT

## 2019-09-04 PROCEDURE — 36415 COLL VENOUS BLD VENIPUNCTURE: CPT

## 2019-09-04 PROCEDURE — 99214 OFFICE O/P EST MOD 30 MIN: CPT | Mod: 25

## 2019-09-04 PROCEDURE — G0009: CPT

## 2019-09-04 PROCEDURE — 90670 PCV13 VACCINE IM: CPT

## 2019-09-04 NOTE — PHYSICAL EXAM
[No Acute Distress] : no acute distress [Well Nourished] : well nourished [Normal Outer Ear/Nose] : the outer ears and nose were normal in appearance [Normal Oropharynx] : the oropharynx was normal [No JVD] : no jugular venous distention [No Lymphadenopathy] : no lymphadenopathy [No Accessory Muscle Use] : no accessory muscle use [No Respiratory Distress] : no respiratory distress  [Regular Rhythm] : with a regular rhythm [Normal Rate] : normal rate  [No HSM] : no HSM [Normal Bowel Sounds] : normal bowel sounds

## 2019-09-05 LAB
ALBUMIN SERPL ELPH-MCNC: 4.9 G/DL
ALP BLD-CCNC: 41 U/L
ALT SERPL-CCNC: 57 U/L
ANION GAP SERPL CALC-SCNC: 15 MMOL/L
AST SERPL-CCNC: 77 U/L
BASOPHILS # BLD AUTO: 0.07 K/UL
BASOPHILS NFR BLD AUTO: 0.9 %
BILIRUB SERPL-MCNC: 1.1 MG/DL
BUN SERPL-MCNC: 17 MG/DL
CALCIUM SERPL-MCNC: 10.4 MG/DL
CHLORIDE SERPL-SCNC: 100 MMOL/L
CHOLEST SERPL-MCNC: 178 MG/DL
CHOLEST/HDLC SERPL: 3.6 RATIO
CO2 SERPL-SCNC: 26 MMOL/L
CREAT SERPL-MCNC: 1.1 MG/DL
EOSINOPHIL # BLD AUTO: 0.37 K/UL
EOSINOPHIL NFR BLD AUTO: 4.7 %
ESTIMATED AVERAGE GLUCOSE: 126 MG/DL
GLUCOSE SERPL-MCNC: 115 MG/DL
HBA1C MFR BLD HPLC: 6 %
HCT VFR BLD CALC: 50.5 %
HDLC SERPL-MCNC: 50 MG/DL
HGB BLD-MCNC: 16.3 G/DL
IMM GRANULOCYTES NFR BLD AUTO: 0.4 %
LDLC SERPL CALC-MCNC: 95 MG/DL
LYMPHOCYTES # BLD AUTO: 2.77 K/UL
LYMPHOCYTES NFR BLD AUTO: 35.5 %
MAN DIFF?: NORMAL
MCHC RBC-ENTMCNC: 32.3 GM/DL
MCHC RBC-ENTMCNC: 33.1 PG
MCV RBC AUTO: 102.6 FL
MONOCYTES # BLD AUTO: 0.77 K/UL
MONOCYTES NFR BLD AUTO: 9.9 %
NEUTROPHILS # BLD AUTO: 3.8 K/UL
NEUTROPHILS NFR BLD AUTO: 48.6 %
PLATELET # BLD AUTO: 174 K/UL
POTASSIUM SERPL-SCNC: 4.1 MMOL/L
PROT SERPL-MCNC: 7.8 G/DL
RBC # BLD: 4.92 M/UL
RBC # FLD: 13.4 %
SODIUM SERPL-SCNC: 141 MMOL/L
T4 FREE SERPL-MCNC: 0.9 NG/DL
TRIGL SERPL-MCNC: 163 MG/DL
TSH SERPL-ACNC: 1.22 UIU/ML
WBC # FLD AUTO: 7.81 K/UL

## 2019-09-06 NOTE — PLAN
[FreeTextEntry1] : Annual exam\par had flu and pneumo 23\par prevnar today\par colon within 5 years\par \par \par bp ok\par cholesterol fair\par consider adding zetia\par diabetes to check

## 2019-09-06 NOTE — HEALTH RISK ASSESSMENT
[Fair] :  ~his/her~ mood as fair [No falls in past year] : Patient reported no falls in the past year [0] : 2) Feeling down, depressed, or hopeless: Not at all (0) [Hepatitis C test offered] : Hepatitis C test offered [With Family] : lives with family [College] : College [] :  [Fully functional (bathing, dressing, toileting, transferring, walking, feeding)] : Fully functional (bathing, dressing, toileting, transferring, walking, feeding) [Fully functional (using the telephone, shopping, preparing meals, housekeeping, doing laundry, using] : Fully functional and needs no help or supervision to perform IADLs (using the telephone, shopping, preparing meals, housekeeping, doing laundry, using transportation, managing medications and managing finances) [Smoke Detector] : smoke detector [Carbon Monoxide Detector] : carbon monoxide detector [Seat Belt] :  uses seat belt [[id number]] : 0 [Change in mental status noted] : No change in mental status noted [Language] : denies difficulty with language [Behavior] : denies difficulty with behavior [Learning/Retaining New Information] : denies difficulty learning/retaining new information [Handling Complex Tasks] : denies difficulty handling complex tasks [Reasoning] : denies difficulty with reasoning [Spatial Ability and Orientation] : denies difficulty with spatial ability and orientation [Reports changes in hearing] : Reports no changes in hearing [Reports changes in dental health] : Reports no changes in dental health [Guns at Home] : no guns at home

## 2019-09-06 NOTE — HISTORY OF PRESENT ILLNESS
[FreeTextEntry1] : Annual exam [de-identified] : Annual exam\par had flu  and pneumo 23\par colon 2014\par \par Recent cath with stent\par on asa and plavix\par s/p hip need other hip\par diabetes fasting running a little high\par intolerant of crestor takes every other day\par ldl high will consider adding zetia\par some pain right groin since cath\par

## 2019-09-09 ENCOUNTER — RX RENEWAL (OUTPATIENT)
Age: 79
End: 2019-09-09

## 2019-09-12 ENCOUNTER — MEDICATION RENEWAL (OUTPATIENT)
Age: 79
End: 2019-09-12

## 2019-10-14 NOTE — CHART NOTE - NSCHARTNOTEFT_GEN_A_CORE
Admit- patient underwent a PCI procedure and is being admitted due to high risk characteristics and is considered to be at an increased risk of major adverse cardiovascular events if discharged at this time     Unstable angina   bifurcation lesion   significant HTN Laparascopic Sleeve Gastrectomy Operative Report    Date of Surgery: 10/14/2019     Preoperative Diagnosis: Morbid Obesity with a BMI of 41    Postoperative Diagnosis: Same as the above    Procedure: Procedure(s):  LAPAROSCOPIC SLEEVE GASTRECTOMY     Surgeon(s) and Role:     Nimo Melara MD - Primary     Anesthesia:  GETA plus local     NAME OF PROCEDURE: LAPAROSCOPIC SLEEVE GASTRECTOMY    ESTIMATED BLOOD LOSS: 25 mL. SPECIMENS: Segment of the stomach. HISTORY: This is a 21 y.o. female who came to the surgical weight loss  clinic at [facility] of her own volition for consideration of bariatric surgery. The patient went to an information session, met with the dietician and psychologist. she came in and I discussed a gastric bypass versus a sleeve gastrectomy with the patient. I recommended a sleeve gastrectomy. The  patient agreed and signed a consent form. For risks, I mentioned risks of bleeding, infection, anesthesia, injury to the esophagus, stomach, small bowel, liver, spleen, large bowel. I also went through risks of myocardial infarction, pneumonia and leak from the staple line of the sleeve gastrectomy. I said that a leak could produce peritonitis, multi-system organ failure, and even death. There is a 1% nationwide mortality rate for this procedure. The patient understood and still wished to proceed. PROCEDURE IN DETAIL: The patient was brought to an operating room at the [address] [place] [address] where general endotracheal anesthesia was administered without complications. She received 2 grams of Ancef as prophylactic antibiotic coverage. The nursing staff applied sequential compression devices and inserted a Carrasquillo catheter. The abdomen was prepped and draped in the usual sterile manner. An incision was made superior into the patient's left of the umbilicus overlying the left rectus abdominis muscle.  An Optiview trocar was placed in the peritoneal cavity under direct vision with 0 degree 10 mm scope. Once in the peritoneal cavity, the abdomen was insufflated to 15 mmHg using carbon dioxide gas. The table was placed in reverse Trendelenburg position. A 5 and 15 mm trocar placed in the right upper quadrant under direct vision. A 5 mm trocar was placed in the epigastric region and then removed. Through this tract, a Ritika liver retractor was placed and used to retract the left lobe of the liver throughout the remainder of the procedure. It was attached to an Omni self-retaining retracting device. A 5 mm trocar was placed in the left upper quadrant to be used for retraction throughout the procedure. We found the pylorus and measured 6 cm proximal to the pylorus in an area along the greater curvature. The nurse anesthetist placed a 40 Fr Visi-G bougie/calibration tube which was manipulated along the lesser curvature. The device was attached to suction which fixed it in place. We made a small aperture with the Enseal Trio device. We then took the gastrocolic ligament from this point all the way up to the left jones of the diaphragm. We cleared off some posterior adhesions with the Enseal device as well. We then used the [place] 60 stapling device. The first cartridge was a black staple cartridge. We were able to push the bougie into the pre-pyloric channel and along the lesser curvature, where it remained. The next  staple cartridge was green followed by blue cartridges until the stomach was completely divided along the calibration tube. We checked the staple line. Some small bleeding points were cauterized using electrocautery with the spatula. The remnant of the stomach had a stitch of 0 Ethibond placed in the antrum area and the excised stomach was then placed into a large [employer]. The Endopouch was closed and brought up through the 15 mm trocar incision which was a enlarged with the electrocautery.  The bag was removed including the excised stomach which was sent to pathology. The 15 mm trocar was returned. We checked the staple line again and there was no evidence of bleeding. The bougie tube was removed and then replaced. It passed easily from the distal esophagus into the proximal stomach and was advanced back into the pre-pyloric channel under direct vision. There was no evidence of any staple line leak or bleeding. There was  evidence of any bleeding along the staple line which was stopped with 10 mm clips and spot cautery. The bougie was removed for the last time. We removed all the trocars under direct vision with no evidence of bleeding from the trocar sites. The fascia of the 15 mm trocar was closed with interrupted sutures of 0 Vicryl. The skin was closed with subcuticular sutures of 4-0 Monocryl. The patient received 30 mL 0.5% Marcaine in 5 mL doses to each incision site. Mastisol and Steri-Strips were placed over the wounds. The patient tolerated procedure well. She was extubated and brought to the recovery room in stable condition.         Elida Calabrese MD

## 2019-10-15 NOTE — PATIENT PROFILE ADULT - NSPRESCRUSEDDRG_GEN_A_NUR
Nephrology progress note    Patient is seen and examined, events over the last 24 h noted .  On vent, awake, not in distress  Allergies:  No Known Allergies    Hospital Medications:   MEDICATIONS  (STANDING):  BACItracin   Ointment 1 Application(s) Topical two times a day  carbidopa/levodopa  10/100 1 Tablet(s) Oral daily  carbidopa/levodopa  25/100 1 Tablet(s) Oral at bedtime  chlorhexidine 0.12% Liquid 15 milliLiter(s) Oral Mucosa every 12 hours  clotrimazole 1% Topical Cream - Peds 1 Application(s) Topical two times a day  furosemide   Injectable 40 milliGRAM(s) IV Push daily  insulin glargine Injectable (LANTUS) 6 Unit(s) SubCutaneous at bedtime  levothyroxine 75 MICROGram(s) Oral daily  meropenem  IVPB      meropenem  IVPB 500 milliGRAM(s) IV Intermittent every 12 hours  midazolam Infusion 0.02 mG/kG/Hr (1.5 mL/Hr) IV Continuous <Continuous>  multivitamin 1 Tablet(s) Oral daily  pantoprazole  Injectable 40 milliGRAM(s) IV Push every 12 hours  senna 2 Tablet(s) Oral at bedtime        VITALS:  T(F): 98.5 (10-15-19 @ 07:10), Max: 98.5 (10-15-19 @ 07:10)  HR: 55 (10-15-19 @ 08:10)  BP: 122/85 (10-15-19 @ 07:10)  RR: 16 (10-15-19 @ 07:10)  SpO2: 100% (10-15-19 @ 08:10)  Wt(kg): --    10-13 @ :  -  10-14 @ 07:00  --------------------------------------------------------  IN: 0.2 mL / OUT: 700 mL / NET: -699.8 mL    10-14 @ 07:01  -  10-15 @ 07:00  --------------------------------------------------------  IN: 7.5 mL / OUT: 2200 mL / NET: -2192.5 mL    10-15 @ 07:01  -  10-15 @ 15:26  --------------------------------------------------------  IN: 0 mL / OUT: 1100 mL / NET: -1100 mL        Weight (kg): 76.3 (10-14 @ 19:48)    PHYSICAL EXAM:  Constitutional: NAD On vent  Respiratory: rhonchi+  Cardiovascular: S1, S2, RRR  Gastrointestinal: BS+, soft, NT/ND  Extremities: 1+ peripheral edema  Neurological: Awake  : + sanchez.   Skin: No rashes  Vascular Access:    LABS:  10-15    133<L>  |  89<L>  |  69<HH>  ----------------------------<  117<H>  4.0   |  30  |  1.9<H>    Ca    9.0      15 Oct 2019 05:39  Mg     2.1     10-15    TPro  5.4<L>  /  Alb  2.8<L>  /  TBili  0.5  /  DBili      /  AST  16  /  ALT  9   /  AlkPhos  94  10-15                          7.8    6.53  )-----------( 213      ( 15 Oct 2019 05:39 )             23.5       Urine Studies:  Urinalysis Basic - ( 13 Oct 2019 06:30 )    Color: Light Yellow / Appearance: Clear / S.008 / pH:   Gluc:  / Ketone: Negative  / Bili: Negative / Urobili: <2 mg/dL   Blood:  / Protein: Negative / Nitrite: Negative   Leuk Esterase: Large / RBC: 21 /HPF / WBC 75 /HPF   Sq Epi:  / Non Sq Epi: 1 /HPF / Bacteria: Negative      Sodium, Random Urine: 67.0 mmoL/L (10-15 @ 04:00)  Creatinine, Random Urine: 13 mg/dL (10-15 @ 04:00)  Potassium, Random Urine: 20 mmol/L (10-15 @ 04:00)    RADIOLOGY & ADDITIONAL STUDIES:  < from: Xray Chest 1 View- PORTABLE-Routine (10.14.19 @ 04:25) >  Unchanged left greater than right bilateral opacities/effusions.    < end of copied text > No

## 2019-11-06 ENCOUNTER — RX RENEWAL (OUTPATIENT)
Age: 79
End: 2019-11-06

## 2019-11-06 RX ORDER — LISINOPRIL 5 MG/1
5 TABLET ORAL
Qty: 90 | Refills: 3 | Status: ACTIVE | COMMUNITY
Start: 2017-01-10 | End: 1900-01-01

## 2019-11-11 ENCOUNTER — APPOINTMENT (OUTPATIENT)
Dept: CARDIOLOGY | Facility: CLINIC | Age: 79
End: 2019-11-11
Payer: MEDICARE

## 2019-11-11 ENCOUNTER — NON-APPOINTMENT (OUTPATIENT)
Age: 79
End: 2019-11-11

## 2019-11-11 VITALS
BODY MASS INDEX: 36.57 KG/M2 | HEIGHT: 67 IN | SYSTOLIC BLOOD PRESSURE: 185 MMHG | WEIGHT: 233 LBS | HEART RATE: 66 BPM | DIASTOLIC BLOOD PRESSURE: 79 MMHG | OXYGEN SATURATION: 98 %

## 2019-11-11 VITALS — SYSTOLIC BLOOD PRESSURE: 166 MMHG | DIASTOLIC BLOOD PRESSURE: 86 MMHG

## 2019-11-11 DIAGNOSIS — I08.0 RHEUMATIC DISORDERS OF BOTH MITRAL AND AORTIC VALVES: ICD-10-CM

## 2019-11-11 DIAGNOSIS — I10 ESSENTIAL (PRIMARY) HYPERTENSION: ICD-10-CM

## 2019-11-11 DIAGNOSIS — I25.10 ATHEROSCLEROTIC HEART DISEASE OF NATIVE CORONARY ARTERY W/OUT ANGINA PECTORIS: ICD-10-CM

## 2019-11-11 PROCEDURE — 99214 OFFICE O/P EST MOD 30 MIN: CPT

## 2019-11-11 PROCEDURE — 93000 ELECTROCARDIOGRAM COMPLETE: CPT

## 2019-11-11 NOTE — PHYSICAL EXAM
[General Appearance - Well Developed] : well developed [Normal Appearance] : normal appearance [General Appearance - Well Nourished] : well nourished [Well Groomed] : well groomed [No Deformities] : no deformities [General Appearance - In No Acute Distress] : no acute distress [Normal Conjunctiva] : the conjunctiva exhibited no abnormalities [Eyelids - No Xanthelasma] : the eyelids demonstrated no xanthelasmas [Normal Oral Mucosa] : normal oral mucosa [No Oral Pallor] : no oral pallor [No Oral Cyanosis] : no oral cyanosis [Normal Jugular Venous A Waves Present] : normal jugular venous A waves present [Normal Jugular Venous V Waves Present] : normal jugular venous V waves present [No Jugular Venous Steward A Waves] : no jugular venous steward A waves [Respiration, Rhythm And Depth] : normal respiratory rhythm and effort [Auscultation Breath Sounds / Voice Sounds] : lungs were clear to auscultation bilaterally [Exaggerated Use Of Accessory Muscles For Inspiration] : no accessory muscle use [Heart Rate And Rhythm] : heart rate and rhythm were normal [Heart Sounds] : normal S1 and S2 [Abdomen Tenderness] : non-tender [Abdomen Soft] : soft [Abdomen Mass (___ Cm)] : no abdominal mass palpated [Cyanosis, Localized] : no localized cyanosis [Nail Clubbing] : no clubbing of the fingernails [Petechial Hemorrhages (___cm)] : no petechial hemorrhages [Skin Color & Pigmentation] : normal skin color and pigmentation [No Venous Stasis] : no venous stasis [] : no rash [No Xanthoma] : no  xanthoma was observed [Skin Lesions] : no skin lesions [No Skin Ulcers] : no skin ulcer [Oriented To Time, Place, And Person] : oriented to person, place, and time [Affect] : the affect was normal [No Anxiety] : not feeling anxious [Mood] : the mood was normal [FreeTextEntry1] : Walks with a cane.

## 2019-11-11 NOTE — REASON FOR VISIT
[FreeTextEntry1] : The patient comes in for followup of his atherosclerotic heart disease, aortic valve disease, hypertension, mitral and aortic insufficiency, and spinal stenosis. He denies any chest pains, or palpitations.He has had 3 or 4 episodes of shortness of breath. In addition he has had episodes where he just falls asleep during the day. Is not sleeping well at night. He had his hip surgery and although doesn't hurt while walking it hurts sometimes at night.\par July 9, 2019: One week ago the patient had a thumping in his left chest and broke into a sweat. He went to the emergency room at [facility] and underwent cardiac catheterization with a placed 2 drug eluting stents in his left anterior descending artery. Since coming out of the hospital he has not had any chest pains, shortness of breath, or palpitations.\par November 11, 2019: The patient had Chinese food last night. He then had epistaxis which took several minutes to stop. He denies any chest pains, shortness breath, or palpitations. He takes his diuretics every other day.

## 2019-11-11 NOTE — REVIEW OF SYSTEMS
[Dyspnea on exertion] : dyspnea during exertion [Lower Ext Edema] : lower extremity edema [Negative] : Heme/Lymph [see HPI] : see HPI [Shortness Of Breath] : no shortness of breath [Chest Pain] : no chest pain [Palpitations] : no palpitations

## 2019-11-11 NOTE — DISCUSSION/SUMMARY
[FreeTextEntry1] : The patient was examined. His  blood pressure was 166/76, and  his pulse was 66.. His lungs were clear to auscultation. Cardiac exam reveal a 3/6 systolic ejection murmur in the aortic area. There was also a left carotid bruit. His EKG showed normal sinus rhythm, and nonspecific ST and T wave changes. No acute changes were seen. He will continue his current medication, I  He'll return in 4 months, or earlier if needed.

## 2019-11-14 ENCOUNTER — RX RENEWAL (OUTPATIENT)
Age: 79
End: 2019-11-14

## 2019-11-19 ENCOUNTER — RX RENEWAL (OUTPATIENT)
Age: 79
End: 2019-11-19

## 2019-11-19 RX ORDER — METFORMIN ER 500 MG 500 MG/1
500 TABLET ORAL
Qty: 180 | Refills: 3 | Status: ACTIVE | COMMUNITY
Start: 2017-12-11 | End: 1900-01-01

## 2019-11-30 ENCOUNTER — RX RENEWAL (OUTPATIENT)
Age: 79
End: 2019-11-30

## 2019-12-02 ENCOUNTER — APPOINTMENT (OUTPATIENT)
Dept: INTERNAL MEDICINE | Facility: CLINIC | Age: 79
End: 2019-12-02

## 2020-02-05 ENCOUNTER — APPOINTMENT (OUTPATIENT)
Dept: INTERNAL MEDICINE | Facility: CLINIC | Age: 80
End: 2020-02-05
Payer: MEDICARE

## 2020-02-05 VITALS
DIASTOLIC BLOOD PRESSURE: 70 MMHG | HEART RATE: 72 BPM | SYSTOLIC BLOOD PRESSURE: 140 MMHG | WEIGHT: 232 LBS | BODY MASS INDEX: 36.41 KG/M2 | HEIGHT: 67 IN

## 2020-02-05 DIAGNOSIS — Z95.5 PRESENCE OF CORONARY ANGIOPLASTY IMPLANT AND GRAFT: ICD-10-CM

## 2020-02-05 DIAGNOSIS — E11.9 TYPE 2 DIABETES MELLITUS W/OUT COMPLICATIONS: ICD-10-CM

## 2020-02-05 PROCEDURE — 99213 OFFICE O/P EST LOW 20 MIN: CPT

## 2020-02-05 NOTE — PHYSICAL EXAM
[No Acute Distress] : no acute distress [Well Nourished] : well nourished [Normal Outer Ear/Nose] : the outer ears and nose were normal in appearance [Normal Oropharynx] : the oropharynx was normal [No JVD] : no jugular venous distention [No Accessory Muscle Use] : no accessory muscle use [No Lymphadenopathy] : no lymphadenopathy [No Respiratory Distress] : no respiratory distress  [Regular Rhythm] : with a regular rhythm [Normal Rate] : normal rate

## 2020-02-05 NOTE — HISTORY OF PRESENT ILLNESS
[FreeTextEntry1] : f/u stent [de-identified] : Stent July 3\par feeling good ASHD\par still with blood hip pain and hernia change

## 2020-02-05 NOTE — REVIEW OF SYSTEMS
[Night Sweats] : no night sweats [Chest Pain] : no chest pain [Orthopena] : no orthopnea [Shortness Of Breath] : no shortness of breath [Wheezing] : no wheezing [Abdominal Pain] : no abdominal pain [Vomiting] : no vomiting

## 2020-02-06 LAB
ALBUMIN SERPL ELPH-MCNC: 5.1 G/DL
ALP BLD-CCNC: 43 U/L
ALT SERPL-CCNC: 48 U/L
ANION GAP SERPL CALC-SCNC: 15 MMOL/L
AST SERPL-CCNC: 71 U/L
BASOPHILS # BLD AUTO: 0.08 K/UL
BASOPHILS NFR BLD AUTO: 0.9 %
BILIRUB SERPL-MCNC: 1.2 MG/DL
BUN SERPL-MCNC: 15 MG/DL
CALCIUM SERPL-MCNC: 10.4 MG/DL
CHLORIDE SERPL-SCNC: 102 MMOL/L
CHOLEST SERPL-MCNC: 147 MG/DL
CHOLEST/HDLC SERPL: 2.8 RATIO
CO2 SERPL-SCNC: 27 MMOL/L
CREAT SERPL-MCNC: 1.06 MG/DL
EOSINOPHIL # BLD AUTO: 0.36 K/UL
EOSINOPHIL NFR BLD AUTO: 3.8 %
ESTIMATED AVERAGE GLUCOSE: 134 MG/DL
GLUCOSE SERPL-MCNC: 101 MG/DL
HBA1C MFR BLD HPLC: 6.3 %
HCT VFR BLD CALC: 48.1 %
HDLC SERPL-MCNC: 53 MG/DL
HGB BLD-MCNC: 15.4 G/DL
IMM GRANULOCYTES NFR BLD AUTO: 0.2 %
LDLC SERPL CALC-MCNC: 62 MG/DL
LYMPHOCYTES # BLD AUTO: 2.77 K/UL
LYMPHOCYTES NFR BLD AUTO: 29.6 %
MAN DIFF?: NORMAL
MCHC RBC-ENTMCNC: 32 GM/DL
MCHC RBC-ENTMCNC: 33 PG
MCV RBC AUTO: 103.2 FL
MONOCYTES # BLD AUTO: 0.85 K/UL
MONOCYTES NFR BLD AUTO: 9.1 %
NEUTROPHILS # BLD AUTO: 5.28 K/UL
NEUTROPHILS NFR BLD AUTO: 56.4 %
NT-PROBNP SERPL-MCNC: 214 PG/ML
PLATELET # BLD AUTO: 188 K/UL
POTASSIUM SERPL-SCNC: 4.3 MMOL/L
PROT SERPL-MCNC: 7.6 G/DL
RBC # BLD: 4.66 M/UL
RBC # FLD: 13.7 %
SODIUM SERPL-SCNC: 144 MMOL/L
T4 FREE SERPL-MCNC: 1 NG/DL
TRIGL SERPL-MCNC: 162 MG/DL
TSH SERPL-ACNC: 1.7 UIU/ML
WBC # FLD AUTO: 9.36 K/UL

## 2020-02-10 ENCOUNTER — RX RENEWAL (OUTPATIENT)
Age: 80
End: 2020-02-10

## 2020-03-04 ENCOUNTER — TRANSCRIPTION ENCOUNTER (OUTPATIENT)
Age: 80
End: 2020-03-04

## 2020-03-21 ENCOUNTER — RX RENEWAL (OUTPATIENT)
Age: 80
End: 2020-03-21

## 2020-03-21 RX ORDER — ACETAMINOPHEN AND CODEINE 300; 30 MG/1; MG/1
300-30 TABLET ORAL TWICE DAILY
Qty: 56 | Refills: 0 | Status: ACTIVE | COMMUNITY
Start: 2017-01-10 | End: 1900-01-01

## 2020-04-01 RX ORDER — CEFUROXIME AXETIL 500 MG/1
500 TABLET ORAL
Qty: 20 | Refills: 1 | Status: ACTIVE | COMMUNITY
Start: 2018-04-05 | End: 1900-01-01

## 2020-04-03 DIAGNOSIS — R19.7 DIARRHEA, UNSPECIFIED: ICD-10-CM

## 2020-04-04 ENCOUNTER — NON-APPOINTMENT (OUTPATIENT)
Age: 80
End: 2020-04-04

## 2020-04-04 LAB — GI PCR PANEL, STOOL: NORMAL

## 2020-04-06 ENCOUNTER — NON-APPOINTMENT (OUTPATIENT)
Age: 80
End: 2020-04-06

## 2020-04-08 RX ORDER — COLESEVELAM HYDROCHLORIDE 625 MG/1
625 TABLET, COATED ORAL
Qty: 90 | Refills: 4 | Status: ACTIVE | COMMUNITY
Start: 2020-04-03 | End: 1900-01-01

## 2020-04-09 LAB
C DIFF TOX B STL QL CT TISS CULT: NORMAL
Lab: NORMAL

## 2020-04-13 DIAGNOSIS — Z95.5 PRESENCE OF CORONARY ANGIOPLASTY IMPLANT AND GRAFT: ICD-10-CM

## 2020-04-13 RX ORDER — CLOPIDOGREL BISULFATE 75 MG/1
75 TABLET, FILM COATED ORAL
Qty: 90 | Refills: 3 | Status: DISCONTINUED | COMMUNITY
Start: 2019-07-09 | End: 2020-04-13

## 2020-04-13 RX ORDER — TICAGRELOR 90 MG/1
90 TABLET ORAL TWICE DAILY
Qty: 180 | Refills: 0 | Status: ACTIVE | COMMUNITY
Start: 2020-04-13

## 2020-04-13 RX ORDER — BUMETANIDE 1 MG/1
1 TABLET ORAL DAILY
Qty: 90 | Refills: 0 | Status: ACTIVE | COMMUNITY
Start: 2020-04-13

## 2020-04-13 RX ORDER — METOPROLOL TARTRATE 50 MG/1
50 TABLET, FILM COATED ORAL
Qty: 180 | Refills: 0 | Status: ACTIVE | COMMUNITY
Start: 2020-04-13

## 2020-04-13 RX ORDER — FUROSEMIDE 20 MG/1
20 TABLET ORAL DAILY
Qty: 90 | Refills: 3 | Status: DISCONTINUED | COMMUNITY
Start: 2018-12-04 | End: 2020-04-13

## 2020-05-11 ENCOUNTER — APPOINTMENT (OUTPATIENT)
Dept: CARDIOLOGY | Facility: CLINIC | Age: 80
End: 2020-05-11

## 2020-05-17 ENCOUNTER — RX RENEWAL (OUTPATIENT)
Age: 80
End: 2020-05-17

## 2020-07-06 ENCOUNTER — APPOINTMENT (OUTPATIENT)
Dept: INTERNAL MEDICINE | Facility: CLINIC | Age: 80
End: 2020-07-06

## 2020-07-12 ENCOUNTER — RX RENEWAL (OUTPATIENT)
Age: 80
End: 2020-07-12

## 2020-07-12 RX ORDER — ROSUVASTATIN CALCIUM 5 MG/1
5 TABLET, FILM COATED ORAL DAILY
Qty: 45 | Refills: 3 | Status: ACTIVE | COMMUNITY
Start: 2018-04-05 | End: 1900-01-01

## 2020-08-28 ENCOUNTER — RX RENEWAL (OUTPATIENT)
Age: 80
End: 2020-08-28

## 2020-08-28 RX ORDER — EZETIMIBE 10 MG/1
10 TABLET ORAL
Qty: 90 | Refills: 2 | Status: ACTIVE | COMMUNITY
Start: 2019-09-12 | End: 1900-01-01

## 2020-10-13 ENCOUNTER — APPOINTMENT (OUTPATIENT)
Dept: CARDIOLOGY | Facility: CLINIC | Age: 80
End: 2020-10-13

## 2020-10-28 ENCOUNTER — RX RENEWAL (OUTPATIENT)
Age: 80
End: 2020-10-28

## 2020-12-18 NOTE — ED ADULT NURSE NOTE - FALL HARM RISK CONCLUSION
12/18/2020        Shadi Zarate  [address]  [facility] [address]         Dear Shadi:    Listed below are your recent clinic laboratory results and recommendations.    Lab Services on 12/16/2020   Component Date Value Ref Range Status   • Prostate Specific Antigen 12/16/2020 3.94  <=4.00 ng/mL Final   • Fasting Status 12/16/2020 12  Hours Final   • Sodium 12/16/2020 138  135 - 145 mmol/L Final   • Potassium 12/16/2020 3.9  3.4 - 5.1 mmol/L Final   • Chloride 12/16/2020 105  98 - 107 mmol/L Final   • Carbon Dioxide 12/16/2020 26  21 - 32 mmol/L Final   • Anion Gap 12/16/2020 11  10 - 20 mmol/L Final   • Glucose 12/16/2020 87  65 - 99 mg/dL Final   • BUN 12/16/2020 14  6 - 20 mg/dL Final   • Creatinine 12/16/2020 1.07  0.67 - 1.17 mg/dL Final   • Glomerular Filtration Rate 12/16/2020 72* >90 mL/min/1.73m2 Final   • BUN/ Creatinine Ratio 12/16/2020 13  7 - 25 Final   • Calcium 12/16/2020 9.5  8.4 - 10.2 mg/dL Final   • Bilirubin, Total 12/16/2020 0.4  0.2 - 1.0 mg/dL Final   • GOT/AST 12/16/2020 18  <=37 Units/L Final   • GPT/ALT 12/16/2020 48  <64 Units/L Final   • Alkaline Phosphatase 12/16/2020 57  45 - 117 Units/L Final   • Albumin 12/16/2020 4.0  3.6 - 5.1 g/dL Final   • Protein, Total 12/16/2020 6.7  6.4 - 8.2 g/dL Final   • Globulin 12/16/2020 2.7  2.0 - 4.0 g/dL Final   • A/G Ratio 12/16/2020 1.5  1.0 - 2.4 Final   • Fasting Status 12/16/2020 12  Hours Final   • Cholesterol 12/16/2020 174  <=199 mg/dL Final   • Triglycerides 12/16/2020 102  <=149 mg/dL Final   • HDL 12/16/2020 43  >=40 mg/dL Final   • LDL 12/16/2020 111  <=129 mg/dL Final   • Non-HDL Cholesterol 12/16/2020 131  mg/dL Final   • Cholesterol/ HDL Ratio 12/16/2020 4.0  <=4.4 Final   • Microalbumin, Urine 12/16/2020 0.97  mg/dL Final   • Creatinine, Urine 12/16/2020 182.00  mg/dL Final   • Microalbumin/ Creatinine Ratio 12/16/2020 5.3  <=29.0 mg/g Final       Please contact the clinic for any concerns or questions.  As always your  health is our primary concern, and we want to thank you for choosing [facility] for your care.    Sincerely,    Jorge Chen, DO   [employer]

## 2020-12-20 ENCOUNTER — RX RENEWAL (OUTPATIENT)
Age: 80
End: 2020-12-20

## 2020-12-22 ENCOUNTER — RX RENEWAL (OUTPATIENT)
Age: 80
End: 2020-12-22

## 2021-01-27 ENCOUNTER — RX RENEWAL (OUTPATIENT)
Age: 81
End: 2021-01-27

## 2021-01-27 RX ORDER — FINASTERIDE 5 MG/1
5 TABLET, FILM COATED ORAL DAILY
Qty: 90 | Refills: 0 | Status: ACTIVE | COMMUNITY
Start: 2017-11-14 | End: 1900-01-01

## 2021-03-10 NOTE — PHYSICAL THERAPY INITIAL EVALUATION ADULT - PLANNED THERAPY INTERVENTIONS, PT EVAL
To get better
gait training/transfer training/bed mobility training/GOAL: Pt will negotiate 9 steps with unilateral handrail in a step-to pattern independently in 2 weeks

## 2021-04-14 ENCOUNTER — RX RENEWAL (OUTPATIENT)
Age: 81
End: 2021-04-14

## 2021-06-22 ENCOUNTER — RX RENEWAL (OUTPATIENT)
Age: 81
End: 2021-06-22

## 2021-07-21 ENCOUNTER — RX RENEWAL (OUTPATIENT)
Age: 81
End: 2021-07-21

## 2021-07-21 RX ORDER — BLOOD SUGAR DIAGNOSTIC
STRIP MISCELLANEOUS
Qty: 100 | Refills: 2 | Status: ACTIVE | COMMUNITY
Start: 2020-10-28 | End: 1900-01-01

## 2021-09-16 ENCOUNTER — RX RENEWAL (OUTPATIENT)
Age: 81
End: 2021-09-16

## 2021-09-16 RX ORDER — FAMOTIDINE 40 MG/1
40 TABLET, FILM COATED ORAL
Qty: 90 | Refills: 2 | Status: ACTIVE | COMMUNITY
Start: 2020-12-20 | End: 1900-01-01

## 2021-11-10 ENCOUNTER — RX RENEWAL (OUTPATIENT)
Age: 81
End: 2021-11-10

## 2021-11-23 RX ORDER — FENOFIBRATE 160 MG/1
160 TABLET ORAL
Qty: 90 | Refills: 1 | Status: ACTIVE | COMMUNITY
Start: 2017-08-29 | End: 1900-01-01

## 2022-02-26 NOTE — PATIENT PROFILE ADULT. - REASON FOR ADMISSION
Pt still sitting on edge of bed c/o pain. MD made aware. See new orders.       Lyndsay Wallace RN  02/25/22 2049 "Hip pain."

## 2022-03-09 ENCOUNTER — RX RENEWAL (OUTPATIENT)
Age: 82
End: 2022-03-09

## 2022-06-15 ENCOUNTER — RX RENEWAL (OUTPATIENT)
Age: 82
End: 2022-06-15

## 2022-09-08 ENCOUNTER — APPOINTMENT (OUTPATIENT)
Dept: DERMATOLOGY | Facility: CLINIC | Age: 82
End: 2022-09-08

## 2022-10-14 ENCOUNTER — RX RENEWAL (OUTPATIENT)
Age: 82
End: 2022-10-14

## 2022-12-01 ENCOUNTER — APPOINTMENT (OUTPATIENT)
Dept: DERMATOLOGY | Facility: CLINIC | Age: 82
End: 2022-12-01

## 2022-12-01 DIAGNOSIS — R22.9 LOCALIZED SWELLING, MASS AND LUMP, UNSPECIFIED: ICD-10-CM

## 2022-12-01 PROCEDURE — 99203 OFFICE O/P NEW LOW 30 MIN: CPT | Mod: GC

## 2022-12-22 ENCOUNTER — RX RENEWAL (OUTPATIENT)
Age: 82
End: 2022-12-22

## 2023-03-07 NOTE — PATIENT PROFILE ADULT. - PURPOSEFUL PROACTIVE ROUNDING
Detail Level: Zone
Initiate Treatment: Tretinoin cream three times weekly , increase to four nights a week \\nClindamycin gel once nightly \\nNo moisturizer
Render In Strict Bullet Format?: No
Patient

## 2023-03-10 NOTE — H&P PST ADULT - OTHER CARE PROVIDERS
New blood work results from 3/10/23, please review and advise   Cardiologist: Dr. Grayson Toribio # [phone]

## 2023-05-12 ENCOUNTER — TRANSCRIPTION ENCOUNTER (OUTPATIENT)
Age: 83
End: 2023-05-12

## 2023-12-18 NOTE — OCCUPATIONAL THERAPY INITIAL EVALUATION ADULT - FINE MOTOR COORDINATION, LEFT HAND THUMB/FINGER OPPOSITION SKILLS, OT EVAL

## 2025-04-22 PROCEDURE — 0: CUSTOM
